# Patient Record
Sex: MALE | Race: WHITE | Employment: FULL TIME | URBAN - METROPOLITAN AREA
[De-identification: names, ages, dates, MRNs, and addresses within clinical notes are randomized per-mention and may not be internally consistent; named-entity substitution may affect disease eponyms.]

---

## 2019-10-08 PROBLEM — Z98.890 S/P RIGHT KNEE ARTHROSCOPY: Status: ACTIVE | Noted: 2019-10-08

## 2024-08-30 ENCOUNTER — APPOINTMENT (OUTPATIENT)
Dept: GENERAL RADIOLOGY | Facility: HOSPITAL | Age: 54
End: 2024-08-30
Attending: EMERGENCY MEDICINE
Payer: OTHER MISCELLANEOUS

## 2024-08-30 ENCOUNTER — HOSPITAL ENCOUNTER (EMERGENCY)
Facility: HOSPITAL | Age: 54
Discharge: HOME | End: 2024-08-30
Attending: EMERGENCY MEDICINE
Payer: OTHER MISCELLANEOUS

## 2024-08-30 VITALS
SYSTOLIC BLOOD PRESSURE: 159 MMHG | TEMPERATURE: 98 F | BODY MASS INDEX: 40 KG/M2 | OXYGEN SATURATION: 97 % | RESPIRATION RATE: 16 BRPM | WEIGHT: 270 LBS | DIASTOLIC BLOOD PRESSURE: 90 MMHG | HEART RATE: 73 BPM

## 2024-08-30 DIAGNOSIS — M25.511 ACUTE PAIN OF RIGHT SHOULDER: Primary | ICD-10-CM

## 2024-08-30 PROCEDURE — 73030 X-RAY EXAM OF SHOULDER: CPT | Performed by: EMERGENCY MEDICINE

## 2024-08-30 PROCEDURE — 96372 THER/PROPH/DIAG INJ SC/IM: CPT

## 2024-08-30 PROCEDURE — 99283 EMERGENCY DEPT VISIT LOW MDM: CPT

## 2024-08-30 PROCEDURE — 99284 EMERGENCY DEPT VISIT MOD MDM: CPT

## 2024-08-30 RX ORDER — KETOROLAC TROMETHAMINE 30 MG/ML
30 INJECTION, SOLUTION INTRAMUSCULAR; INTRAVENOUS ONCE
Status: COMPLETED | OUTPATIENT
Start: 2024-08-30 | End: 2024-08-30

## 2024-08-30 NOTE — ED INITIAL ASSESSMENT (HPI)
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 Fell onto right elbow with arm bent. Complaining of right shoulder pain, difficulty lifting shoulder.

## 2024-08-31 NOTE — ED QUICK NOTES
Late entry d/t pt care:  Assumed care of pt from Lizette RN:  Pt resting on ED cart, breathing non-labored with equal and symmetrical chest rise and fall, in no apparent distress observed, updated pt on POC: awaiting x-ray results for dispo.

## 2024-08-31 NOTE — ED PROVIDER NOTES
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  He denies any elbow or hand pain.  Denies any weakness or numbness or paresthesias. No head injuries     History reviewed.   Past Medical History:    High blood pressure       History reviewed.   Past Surgical History:   Procedure Laterality Date    Knee arthroscopy Right          Medications :  (Not in a hospital admission)       No family history on file.    Smoking Status:   Social History     Socioeconomic History    Marital status:    Tobacco Use    Smoking status: Former    Smokeless tobacco: Never   Substance and Sexual Activity    Alcohol use: Yes       Constitutional and vital signs reviewed.      Social History and Family History elements reviewed from today, pertinent positives to the presenting problem noted.    Physical Exam     ED Triage Vitals [08/30/24 1723]   /90   Pulse 73   Resp 16   Temp 98.2 °F (36.8 °C)   Temp src Temporal   SpO2 97 %   O2 Device None (Room air)       All measures to prevent infection transmission during my interaction with the patient were taken. The patient was already wearing a droplet mask on my arrival to the room. Personal protective equipment was worn throughout the duration of the exam.  Handwashing was performed prior to and after the exam.  Stethoscope and any equipment used during my examination was cleaned with super sani-cloth germicidal wipes following the exam.     Physical Exam    General: NAD  Head: Normocephalic and atraumatic.  Mouth/Throat/Ears/Nose: No hoarseness of voice  Eyes: Conjunctivae and EOM are normal.  Neck: Normal range of motion. Supple. No midline cervical pain   Cardiovascular: Normal rate  Respiratory/Chest: No tachypnea. CTAB  Gastrointestinal:  Nondistended  Musculoskeletal:No swelling or deformity. Ttp at R shoulder. 2+ radial pulse, SILT, no open lesions.  5 out of 5 right hand  strength, 5 out of 5 shoulder abduction strength.  Neurological: Alert and appropriate.   Skin: Skin is warm and dry. No pallor.  Psychiatric: Has a normal mood and affect.        ED Course      Labs Reviewed - No data to display    As Interpreted by me    Imaging Results Available and Reviewed while in ED: No results found.  ED Medications Administered:   Medications   ketorolac (Toradol) 30 MG/ML injection 30 mg (30 mg Intramuscular Given 8/30/24 1754)         MDM     Vitals:    08/30/24 1723   BP: 159/90   Pulse: 73   Resp: 16   Temp: 98.2 °F (36.8 °C)   TempSrc: Temporal   SpO2: 97%   Weight: 122.5 kg     *I personally reviewed and interpreted all ED vitals.    Pulse Ox: 97%, Room air, Normal       Medical Decision Making      Differential Diagnosis/ Diagnostic Considerations: R shoulder fracture, contusion     Complicating Factors: The patient already has does not have any pertinent problems on file. to contribute to the complexity of this ED evaluation.    I reviewed prior chart records including office note from 10/1/2019.  Patient here with right shoulder contusion, x-ray negative for fracture on my interpretation.  Feels improved after supportive care.    Occupational health referral provided.  Dc In stable condition.  Patient is comfortable with the plan.    Prescriptions:  over-the-counter ibuprofen      Disposition and Plan     Clinical Impression:  1. Acute pain of right shoulder        Disposition:  Discharge    Follow-up:  [facility]  [address] [id number] [phone]  Call in 3 days      Ry Bedoya MD  [address]  [address]  [facility] [address]  [phone]    Schedule an appointment as soon as possible for a visit in 1 day(s)        Medications Prescribed:  Discharge Medication List as of 8/30/2024   7:30 PM

## 2024-09-10 ENCOUNTER — OFFICE VISIT (OUTPATIENT)
Dept: ORTHOPEDICS CLINIC | Facility: CLINIC | Age: 54
End: 2024-09-10

## 2024-09-10 VITALS — HEART RATE: 69 BPM | DIASTOLIC BLOOD PRESSURE: 80 MMHG | SYSTOLIC BLOOD PRESSURE: 124 MMHG

## 2024-09-10 DIAGNOSIS — M75.21 BICEPS TENDINITIS OF RIGHT SHOULDER: ICD-10-CM

## 2024-09-10 DIAGNOSIS — M75.81 RIGHT ROTATOR CUFF TENDINITIS: Primary | ICD-10-CM

## 2024-09-10 PROCEDURE — 20610 DRAIN/INJ JOINT/BURSA W/O US: CPT | Performed by: ORTHOPAEDIC SURGERY

## 2024-09-10 PROCEDURE — 99244 OFF/OP CNSLTJ NEW/EST MOD 40: CPT | Performed by: ORTHOPAEDIC SURGERY

## 2024-09-10 RX ORDER — TRIAMCINOLONE ACETONIDE 40 MG/ML
40 INJECTION, SUSPENSION INTRA-ARTICULAR; INTRAMUSCULAR ONCE
Status: COMPLETED | OUTPATIENT
Start: 2024-09-10 | End: 2024-09-10

## 2024-09-10 RX ADMIN — TRIAMCINOLONE ACETONIDE 40 MG: 40 INJECTION, SUSPENSION INTRA-ARTICULAR; INTRAMUSCULAR at 17:55:00

## 2024-09-10 NOTE — H&P
NURSING INTAKE COMMENTS:   Chief Complaint   Patient presents with    Injury     Consult- WC- R shoulder- onset 8/30/2024- missed a step on a forklift at work -visit to ER and has xray in the system- rates pain 5-6/10 depends w/ certain movements       HPI: This 54 year old right hand dominant male presents today.  For Workmen's Compensation injury to his right shoulder from 8/30/2024.  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  He went to step down after putting the forklift back on the truck on the date.  He missed a step.  He pulled on his right shoulder but also fell and landed on the right shoulder.  He has persistent pain with certain activities and sleeping sometimes is difficult.  He has continued to work full duty.  He denies prior problems of the right shoulder.  He denies numbness or tingling or cervical radiculopathy complaints.    He lives independent with his wife, daughter, and mother-in-law.  He drives regular car.  Outside of work he leads a sedentary lifestyle.  He is not diabetic.    Past Medical History:    High blood pressure     Past Surgical History:   Procedure Laterality Date    Knee arthroscopy Right      Current Outpatient Medications   Medication Sig Dispense Refill    HYDROcodone-acetaminophen (NORCO) 5-325 MG Oral Tab 1po q 4 hrs prn pain, may increase to 2 tabs q 6 hrs prn severe pain (Patient not taking: Reported on 9/10/2024) 20 tablet 0    Irbesartan-hydroCHLOROthiazide 150-12.5 MG Oral Tab  (Patient not taking: Reported on 8/30/2024)      Meloxicam 15 MG Oral Tab Take 1 tablet (15 mg total) by mouth daily. (Patient not taking: Reported on 9/10/2024) 30 tablet 1     No Known Allergies  History reviewed. No pertinent family history.  No family Hx of DVT/PE    Social History     Occupational History    Not on file   Tobacco Use    Smoking status: Former    Smokeless tobacco:  Never   Substance and Sexual Activity    Alcohol use: Yes    Drug use: Not on file    Sexual activity: Not on file        Review of Systems:  GENERAL: feels generally well, no significant weight loss or weight gain  SKIN: no ulcerated or worrisome skin lesions  EYES:denies blurred vision or double vision  HEENT: denies new nasal congestion, sinus pain or ST  LUNGS: denies shortness of breath  CARDIOVASCULAR: denies chest pain  GI: no hematemesis, no worsening heartburn, no diarrhea  : no dysuria, no blood in urine, no difficulty urinating, no incontinence  MUSCULOSKELETAL: no other musculoskeletal complaints other than in HPI  NEURO: no numbness or tingling, no weakness or balance disorder  PSYCHE: no depression or anxiety  HEMATOLOGIC: no hx of blood dyscrasia, no Hx DVT/PE  ENDOCRINE: no thyroid or diabetes issues  ALL/ASTHMA: no new hx of severe allergy or asthma    Physical Examination:    /80   Pulse 69   Constitutional: appears well hydrated, alert and responsive, no acute distress noted  Extremities: Contours of the right shoulder were symmetrically normal to the left.  No bruising or ecchymosis either.  Musculoskeletal: Full symmetric motion in all directions right shoulder.  Good rotator cuff strength.  Maybe a little weakness in the supraspinatus compared to the other tendons.  Positive impingement and biceps provocative test.  Tenderness mostly to the rotator cuff insertion site.  No acromioclavicular tenderness and negative crossover.  Neurological: Normal motor and sensory right upper extremity.  No signs of cervical radiculopathy.    Imaging: X-rays of the right shoulder were essentially normal.      XR SHOULDER, COMPLETE (MIN 2 VIEWS), RIGHT (CPT=73030)    Result Date: 8/30/2024  PROCEDURE: XR SHOULDER, COMPLETE (MIN 2 VIEWS), RIGHT (CPT=73030)  COMPARISON: None.  INDICATIONS: Right shoulder pain post accidental fall onto right side today.  TECHNIQUE: 3 views were obtained.   FINDINGS:   BONES: No evidence of acute fracture or dislocation.  Mild acromioclavicular degenerative change. SOFT TISSUES: Negative. No visible soft tissue swelling. EFFUSION: None visible. OTHER: Negative.         CONCLUSION: No acute fracture or dislocation.    Dictated by (CST): Issac Rodriguez MD on 8/30/2024 at 6:54 PM     Finalized by (CST): Issac Rodriguez MD on 8/30/2024 at 6:56 PM             No results found for: \"WBC\", \"HGB\", \"PLT\"   Lab Results   Component Value Date     (H) 09/14/2019    BUN 24.0 (H) 09/14/2019    CREATSERUM 1.09 09/14/2019        Assessment and Plan:  Diagnoses and all orders for this visit:    Right rotator cuff tendinitis  -     PHYSICAL THERAPY - INTERNAL  -     Arthrocentesis aspiration and injection major Right joint bursa w/o US  -     triamcinolone acetonide (Kenalog-40) 40 MG/ML injection 40 mg    Biceps tendinitis of right shoulder  -     PHYSICAL THERAPY - INTERNAL  -     Arthrocentesis aspiration and injection major Right joint bursa w/o US  -     triamcinolone acetonide (Kenalog-40) 40 MG/ML injection 40 mg        Assessment: Mostly rotator cuff tendinitis and some biceps tendinitis.  Work comp injury from 8/30/2024.    Plan: I recommended cortisone injection, physical therapy, and home exercise program learned in therapy.  He agreed to the above.  Aspiration was negative and he tolerated the injection bupivacaine 0.5% 5 cc and Kenalog 1 cc well to the glenohumeral joint with a little to the subacromial space.    He may continue to work full duty without restrictions.  He will do the home exercise program.  I will see him in 4 weeks.  If he is not improved, we would order MRI.    Follow Up: No follow-ups on file.    Ry Bedoya MD

## 2024-09-10 NOTE — PROGRESS NOTES
Per verbal order from Dr. Bedoya, draw up 5ml of 0.5% Marcaine and 1ml of Kenalog 40 for cortisone injection to right shoulder. Janette GRAYSON MA  Patient provided education handout for cortisone injection.

## 2024-10-21 ENCOUNTER — OFFICE VISIT (OUTPATIENT)
Dept: ORTHOPEDICS CLINIC | Facility: CLINIC | Age: 54
End: 2024-10-21
Payer: OTHER MISCELLANEOUS

## 2024-10-21 DIAGNOSIS — G56.02 LEFT CARPAL TUNNEL SYNDROME: ICD-10-CM

## 2024-10-21 DIAGNOSIS — M75.21 BICEPS TENDINITIS OF RIGHT SHOULDER: ICD-10-CM

## 2024-10-21 DIAGNOSIS — M75.81 RIGHT ROTATOR CUFF TENDINITIS: Primary | ICD-10-CM

## 2024-10-21 NOTE — PROGRESS NOTES
NURSING INTAKE COMMENTS:   Chief Complaint   Patient presents with    Follow - Up     Left shoulder- denies pain- PT finished       HPI: This 54 year old male presents today follow-up right shoulder work comp injury as well as a new left carpal tunnel complaint not work comp.    For the right shoulder, I gave cortisone injection and prescribed therapy.  He said the right shoulder is pain-free with full range of motion.  He feels capable of full duty without restriction.    The left carpal tunnel was evident about 2 years ago.  He had EMG about 2 years ago but cannot recall where it was done.  He was scheduled for surgery but it improved and he canceled surgery.    He flared with the left carpal tunnel this past weekend without specific trauma.  He went to the ER.  He gets numbness and tingling in the median nerve distribution and pain at the base of the palm.  He feels he has weak .  No cervical radiculopathy complaints.    He denied chest pain, shortness of breath, heart attack, stroke, or history of blood clot.    Past Medical History:    High blood pressure     Past Surgical History:   Procedure Laterality Date    Knee arthroscopy Right      Current Outpatient Medications   Medication Sig Dispense Refill    HYDROcodone-acetaminophen (NORCO) 5-325 MG Oral Tab 1po q 4 hrs prn pain, may increase to 2 tabs q 6 hrs prn severe pain (Patient not taking: Reported on 10/21/2024) 20 tablet 0    Irbesartan-hydroCHLOROthiazide 150-12.5 MG Oral Tab  (Patient not taking: Reported on 10/21/2024)      Meloxicam 15 MG Oral Tab Take 1 tablet (15 mg total) by mouth daily. (Patient not taking: Reported on 10/21/2024) 30 tablet 1     Allergies[1]  History reviewed. No pertinent family history.  No family Hx of DVT/PE    Social History     Occupational History    Not on file   Tobacco Use    Smoking status: Former    Smokeless tobacco: Never   Substance and Sexual Activity    Alcohol use: Yes    Drug use: Not on file    Sexual  activity: Not on file        Review of Systems:  GENERAL: feels generally well, no significant weight loss or weight gain  SKIN: no ulcerated or worrisome skin lesions  EYES:denies blurred vision or double vision  HEENT: denies new nasal congestion, sinus pain or ST  LUNGS: denies shortness of breath  CARDIOVASCULAR: denies chest pain  GI: no hematemesis, no worsening heartburn, no diarrhea  : no dysuria, no blood in urine, no difficulty urinating, no incontinence  MUSCULOSKELETAL: no other musculoskeletal complaints other than in HPI  NEURO: no numbness or tingling, no weakness or balance disorder  PSYCHE: no depression or anxiety  HEMATOLOGIC: no hx of blood dyscrasia, no Hx DVT/PE  ENDOCRINE: no thyroid or diabetes issues  ALL/ASTHMA: no new hx of severe allergy or asthma    Physical Examination:    There were no vitals taken for this visit.  Constitutional: appears well hydrated, alert and responsive, no acute distress noted  Extremities: Right shoulder with benign exam and full range of motion and normal strength.  Negative provocative tests.  Musculoskeletal: Left hand with no thenar or hypothenar atrophy or vascular insufficiency.  He has positive Tinel's sign and positive median nerve compression test on the left at 7 seconds.  Weak  compared to right.  Pinch is still strong.  Neurological: Decreased sensation median nerve distribution left hand.    Imaging: None taken today.      No results found.     No results found for: \"WBC\", \"HGB\", \"PLT\"   Lab Results   Component Value Date     (H) 09/14/2019    BUN 24.0 (H) 09/14/2019    CREATSERUM 1.09 09/14/2019        Assessment and Plan:  Diagnoses and all orders for this visit:    Right rotator cuff tendinitis    Biceps tendinitis of right shoulder    Left carpal tunnel syndrome  -     US WRIST LEFT COMPLETE (CPT=76881); Future        Assessment: For the right shoulder, he may be considered at maximal medical improvement and continue to work full  duty without restrictions.  He will see me as needed.    Plan: For the left carpal tunnel syndrome which is not a Workmen's Compensation injury, we talked about bracing for another month versus surgical release.  He was scheduled for surgery 2 years ago but canceled.  He wants surgery at this time.  We talked about surgery with local versus local and sedation.  He was going to go with local.    We talked about risks of surgery such as death heart attack and stroke, bleeding infection blood clot, nerve injury, artery injury, and persistent symptoms despite surgery.    As we cannot find the EMG, I recommended ultrasound.  I asked that he leave a phone message once the ultrasound is done and we can talk about the results and talk about scheduling left carpal tunnel release.    Follow Up: No follow-ups on file.    Ry Bedoya MD         [1] No Known Allergies

## 2024-11-11 ENCOUNTER — TELEPHONE (OUTPATIENT)
Dept: ORTHOPEDICS CLINIC | Facility: CLINIC | Age: 54
End: 2024-11-11

## 2024-11-11 NOTE — TELEPHONE ENCOUNTER
EMG results from 6/7/2021 at [facility] [facility] received and in Dr Bedoya's folder for review. Advised patient we will Follow up once Dr Bedoya reviews.   He states there has been an update on his symptoms and states the left ring finger now has pain and right 5th finger has intermittent swelling. Pain also on anterior bilateral shoulders. Advised him I would update Dr Bedoya and Follow up.

## 2024-11-11 NOTE — TELEPHONE ENCOUNTER
Called patient and he states he had an EMG completed in 2021 at [place] for [facility] and it was faxed over last week. Advised him we haven't rc'd and requested he fax to [phone]. He will re-fax today.

## 2024-11-11 NOTE — TELEPHONE ENCOUNTER
Per patient faxed results to [phone], wants to make sure results were received. Please call thank you.

## 2024-11-11 NOTE — TELEPHONE ENCOUNTER
Patient calling to inform results were sent through fax  last week. Patient has symptoms on the other hand of carpo tunnel. Please call at [phone],thanks.

## 2024-11-12 NOTE — TELEPHONE ENCOUNTER
Called patient and informed him per Dr Bedoya's message. He has US scheduled for 12/16 for left wrist. He inquired about the right hand as well and I advised he would need to be evaluated by Dr Bedoya for this concern since he did not seem him for this concern before. Appointment scheduled on 11/19/24 at 420pm with Dr Bedoya.

## 2024-11-15 ENCOUNTER — TELEPHONE (OUTPATIENT)
Dept: ORTHOPEDICS CLINIC | Facility: CLINIC | Age: 54
End: 2024-11-15

## 2024-11-15 NOTE — TELEPHONE ENCOUNTER
Spoke with patient and he states he cannot wait until 12/16 for US left wrist as he is having pain in both hands and wrists that is severe. Patient does have scheduled appointment on 11/19 for evaluation of the right wrist as only the left wrist has been evaluated by Dr Bedoya previously. Patient has tried ibuprofen, tylenol, ice, heat and nothing helps the pain. I did advise he has scheduled appointment on 11/19 and Dr Bedoya can further discuss and eval the appointment of the options. Did advise if pain severe/unbearable go to UC/ER. Patient requesting letter to be off work until eval on 11/19. Advised I would send message to provider but may not get back to him until 11/18. He verbalized understanding.

## 2024-11-15 NOTE — TELEPHONE ENCOUNTER
Patient states both hands are in severe pain and he does not think he can wait for testing and then wait to schedule surgery. Patient is asking to speak with RN about this severe pain. Please call.

## 2024-11-19 ENCOUNTER — OFFICE VISIT (OUTPATIENT)
Dept: ORTHOPEDICS CLINIC | Facility: CLINIC | Age: 54
End: 2024-11-19
Payer: COMMERCIAL

## 2024-11-19 ENCOUNTER — HOSPITAL ENCOUNTER (OUTPATIENT)
Dept: GENERAL RADIOLOGY | Facility: HOSPITAL | Age: 54
Discharge: HOME | End: 2024-11-19
Attending: ORTHOPAEDIC SURGERY
Payer: COMMERCIAL

## 2024-11-19 DIAGNOSIS — M79.641 PAIN OF RIGHT HAND: ICD-10-CM

## 2024-11-19 DIAGNOSIS — G56.02 LEFT CARPAL TUNNEL SYNDROME: ICD-10-CM

## 2024-11-19 DIAGNOSIS — G56.01 RIGHT CARPAL TUNNEL SYNDROME: Primary | ICD-10-CM

## 2024-11-19 PROCEDURE — 99214 OFFICE O/P EST MOD 30 MIN: CPT | Performed by: ORTHOPAEDIC SURGERY

## 2024-11-19 PROCEDURE — 73130 X-RAY EXAM OF HAND: CPT | Performed by: ORTHOPAEDIC SURGERY

## 2024-11-19 NOTE — PROGRESS NOTES
NURSING INTAKE COMMENTS:   Chief Complaint   Patient presents with    Hand Pain     Right - onset a few years back - states he was dx with carpal tunnel - rates pain 4-10/10 on and off - has numbness and tingling        HPI: This 54 year old male presents today with left greater than right carpal tunnel syndrome.  I have been seeing him for the right before but now the left is even worse.  This fairly classic in the median nerve distribution to the first second and third fingers.  He is having a tough time closing his left hand all the way.  Fortunately there is no thenar atrophy on either hand.  Both had numbness and tingling.    He is scheduled for ultrasound December 16, 2024.  He said he cannot wait that long.  He asked for an EMG prescription and said he will find a place that can get him in sooner.    He had his third complaint is left shoulder but it was completely separate and I told him we would have to make another appointment.  He said it was very difficult to do his job because of the carpal tunnel syndrome bilaterally.    Past Medical History:    High blood pressure     Past Surgical History:   Procedure Laterality Date    Knee arthroscopy Right      Current Outpatient Medications   Medication Sig Dispense Refill    HYDROcodone-acetaminophen (NORCO) 5-325 MG Oral Tab 1po q 4 hrs prn pain, may increase to 2 tabs q 6 hrs prn severe pain (Patient not taking: Reported on 10/21/2024) 20 tablet 0    Irbesartan-hydroCHLOROthiazide 150-12.5 MG Oral Tab  (Patient not taking: Reported on 10/21/2024)      Meloxicam 15 MG Oral Tab Take 1 tablet (15 mg total) by mouth daily. (Patient not taking: Reported on 10/21/2024) 30 tablet 1     Allergies[1]  History reviewed. No pertinent family history.  No family Hx of DVT/PE    Social History     Occupational History    Not on file   Tobacco Use    Smoking status: Former    Smokeless tobacco: Never   Substance and Sexual Activity    Alcohol use: Yes    Drug use: Not on  file    Sexual activity: Not on file        Review of Systems:  GENERAL: feels generally well, no significant weight loss or weight gain  SKIN: no ulcerated or worrisome skin lesions  EYES:denies blurred vision or double vision  HEENT: denies new nasal congestion, sinus pain or ST  LUNGS: denies shortness of breath  CARDIOVASCULAR: denies chest pain  GI: no hematemesis, no worsening heartburn, no diarrhea  : no dysuria, no blood in urine, no difficulty urinating, no incontinence  MUSCULOSKELETAL: no other musculoskeletal complaints other than in HPI  NEURO: no numbness or tingling, no weakness or balance disorder  PSYCHE: no depression or anxiety  HEMATOLOGIC: no hx of blood dyscrasia, no Hx DVT/PE  ENDOCRINE: no thyroid or diabetes issues  ALL/ASTHMA: no new hx of severe allergy or asthma    Physical Examination:    There were no vitals taken for this visit.  Constitutional: appears well hydrated, alert and responsive, no acute distress noted  Extremities: The hands appear normal without vascular issues or atrophy.  Musculoskeletal: Left hand makes about 70% of a fist but can extend fully.  Right hand makes 90% of a fist and can extend fully.  Positive Tinel's and positive median nerve compression test at only 2 to 3 seconds bilaterally.  No signs of cervical radiculopathy today.  Slight weakness to pinch and key pinch.   strength weak bilaterally.  Neurological: Above noted and decreased sensation to the median nerve distributions left worse than right.    Imaging: X-ray of the right hand was normal.    No results found.     No results found for: \"WBC\", \"HGB\", \"PLT\"   Lab Results   Component Value Date     (H) 09/14/2019    BUN 24.0 (H) 09/14/2019    CREATSERUM 1.09 09/14/2019        Assessment and Plan:  Diagnoses and all orders for this visit:    Right carpal tunnel syndrome  -     EMG (At [facility]); Future    Pain of right hand  -     XR HAND (MIN 3 VIEWS), RIGHT (CPT=73130);  Future  -     EMG (At [facility]); Future    Left carpal tunnel syndrome  -     EMG (At [facility]); Future        Assessment: Above diagnoses left is worse than right.    Plan: She wants to try to get at least one of the carpal tunnels done before the new year and possibly both.  The neck step will be to get EMGs.  I told him to get me copies of the report.  We can talk over the phone about the results and possibly schedule one of the surgeries as an outpatient under local anesthetic.    Follow Up: No follow-ups on file.    Ry Bedoya MD         [1] No Known Allergies

## 2024-11-26 ENCOUNTER — TELEPHONE (OUTPATIENT)
Dept: ORTHOPEDICS CLINIC | Facility: CLINIC | Age: 54
End: 2024-11-26

## 2024-11-26 NOTE — TELEPHONE ENCOUNTER
Patients spouse called to ask for the forms department fax and e-mail. Provided. Tried to gather details, patient spouse will reach out with details later. Sent Release of Information authorization via [employer].

## 2024-11-27 NOTE — TELEPHONE ENCOUNTER
Additional forms received via fax, have been emailed to forms department and sent to corporate center.

## 2024-12-03 ENCOUNTER — PROCEDURE VISIT (OUTPATIENT)
Dept: PHYSICAL MEDICINE AND REHAB | Facility: CLINIC | Age: 54
End: 2024-12-03
Payer: COMMERCIAL

## 2024-12-03 DIAGNOSIS — G56.03 BILATERAL CARPAL TUNNEL SYNDROME: Primary | ICD-10-CM

## 2024-12-03 PROCEDURE — 95910 NRV CNDJ TEST 7-8 STUDIES: CPT | Performed by: PHYSICAL MEDICINE & REHABILITATION

## 2024-12-03 PROCEDURE — 95885 MUSC TST DONE W/NERV TST LIM: CPT | Performed by: PHYSICAL MEDICINE & REHABILITATION

## 2024-12-03 NOTE — PROCEDURES
[address]  [address]  [place] [address]  Phone: [phone]  Fax: [phone]    ELECTRODIAGNOSTIC REPORT  Patient: Taras Toussaint Hand Dominance:  Right   Patient ID: [id number] Referring Dr:  Ry Bedoya MD   Sex: Male Test Dr:  Roland Ocasio DO   YOB: 1970           Visit Date: 54 Years Examining MD:     Age: 54 Years Referred by:     Height: 5 feet 9 inch     Referred for: 54 year old [place] male presents with bilateral hand numbness and tingling primarily affecting digits 1-3, along with difficulty flexing the left ring finger 2/2 pain. He underwent an EMG of the bilateral upper extremities at [facility] for similar symptoms that resolved upon wearing wrist braces, only for the symptoms to more recently return.     Physical exam:  Normal muscle bulk of both hands  5/5 bilateral APB muscle strength  Gives way with resisted left ring finger flexion 2/2 pain over the area of the A1 pulley. No appreciable locking.      Summary    The motor conduction test was performed on 4 nerve(s). The results were normal in 2 nerve(s): R Ulnar - ADM, L Ulnar - ADM. Results outside the specified normal range were found in 2 nerve(s), as follows:  In the R Median - APB study  the latency was increased for Wrist stimulation  the amplitude was reduced for Wrist stimulation  the response was considered absent for Elbow stimulation  In the L Median - APB study  the latency was increased for Wrist stimulation    The sensory conduction test was performed on 4 nerve(s). The results were normal in 1 nerve(s): R Ulnar - Digit V (Antidromic). Results outside the specified normal range were found in 3 nerve(s), as follows:  In the R Median - Digit II (Antidromic) study  the peak latency was increased for Wrist stimulation  the peak amplitude was reduced for Wrist stimulation  the peak-to-peak amplitude was reduced for Wrist stimulation  In the L Median - Digit II (Antidromic) study  the  peak latency was increased for Wrist stimulation  the peak amplitude was reduced for Wrist stimulation  the peak-to-peak amplitude was reduced for Wrist stimulation  In the L Ulnar - Digit V (Antidromic) study  the peak latency was increased for Wrist stimulation    The needle EMG study was normal in all 2 tested muscles: R. Abductor pollicis brevis, L. Abductor pollicis brevis.    Similar to his previous study at higher stimulation there was incidental electrodiagnostic evidence of a Jaleel-Evita anastomosis in the right upper extremity.     Conclusion:   1) This is an abnormal electrodiagnostic study.   2) There is electrodiagnostic evidence of bilateral median mononeuropathies across the wrists consistent with the clinical diagnosis of bilateral carpal tunnel syndrome. The findings are mixed demyelinating and axonal in the right upper extremity, moderate to severe; primarily demyelinating, moderate in the left upper extremity.   3) There is electrodiagnostic evidence of a mild demyelinating left ulnar sensory mononeuropathy of undetermined and possibly no clinical significance.     In comparison to a previous EMG done in June of 2021 the right median motor studies have worsened; the left median motor study is similar.     Findings correlate well with the symptoms of numbness and tingling. Consideration could be given to symptomatic trigger finger of the left ring finger as well.     Thank you for allowing me to participate in this patient's care,    Roland Ocasio DO  Physical Medicine and Rehabilitation  [facility]  ________________________________      Motor NCS      Nerve / Sites Muscle Latency Amplitude Amp % Duration Segments Distance Lat Diff Velocity     ms mV % ms  cm ms m/s   R Median - APB      Wrist APB 5.15 0.8 100 7.46 Wrist - APB 8        Ref.  <=4.40 >=4.0 >=100  Ref.         Elbow APB NR NR NR NR Elbow - Wrist  NR       Ref.    >=80  Ref.   >=49   L Median - APB      Wrist  APB 4.96 9.6 100 7.58 Wrist - APB 8        Ref.  <=4.40 >=4.0 >=100  Ref.         Elbow APB 9.19 7.9 82.3 7.92 Elbow - Wrist 22.5 4.23 53      Ref.    >=80  Ref.   >=49   R Ulnar - ADM      Wrist ADM 2.81 9.7 100 5.79 Wrist - ADM 8        Ref.  <=3.90 >=5.0 >=100  Ref.         B.Elbow ADM 6.52 9.1 94 5.81 B.Elbow - Wrist 24 3.71 65      Ref.    >=80  Ref.   >=50      A.Elbow ADM 7.69 8.1 83.4 5.48 A.Elbow - B.Elbow 9 1.17 77      Ref.      Ref.   >=50   L Ulnar - ADM      Wrist ADM 2.83 11.3 100 5.88 Wrist - ADM 8        Ref.  <=3.90 >=5.0 >=100  Ref.         B.Elbow ADM 6.40 10.7 94.3 5.96 B.Elbow - Wrist 23 3.56 65      Ref.    >=80  Ref.   >=50      A.Elbow ADM 7.75 10.8 95.3 5.92 A.Elbow - B.Elbow 8 1.35 59      Ref.      Ref.   >=50       Sensory NCS      Nerve / Sites Rec. Site Onset Lat Peak Lat NP Amp PP Amp Segments Distance Velocity     ms ms µV µV  cm m/s   R Median - Digit II (Antidromic)      Wrist Dig II 3.46 4.35 9.6 11.5 Wrist - Dig II 13 38      Ref.   <=3.40 >=15.0 >=20.0 Ref.     L Median - Digit II (Antidromic)      Wrist Dig II 3.85 4.81 5.5 10.8 Wrist - Dig II 13 34      Ref.   <=3.40 >=15.0 >=20.0 Ref.     R Ulnar - Digit V (Antidromic)      Wrist Dig V 2.17 2.92 15.6 16.9 Wrist - Dig V 14 65      Ref.   <=3.70 >=15.0 >=15.0 Ref.     L Ulnar - Digit V (Antidromic)      Wrist Dig V 3.58 4.94 29.0 23.5 Wrist - Dig V 14 39      Ref.   <=3.70 >=15.0 >=15.0 Ref.         EMG Summary Table     Spontaneous MUAP Recruitment   Muscle Nerve Roots IA Fib PSW Fasc H.F. Comments Amp Dur. PPP Pattern   R. Abductor pollicis brevis Median C8-T1 N None None None None Normal N N N N   L. Abductor pollicis brevis Median C8-T1 N None None None None Normal N N N N

## 2024-12-05 ENCOUNTER — TELEPHONE (OUTPATIENT)
Dept: ORTHOPEDICS CLINIC | Facility: CLINIC | Age: 54
End: 2024-12-05

## 2024-12-05 NOTE — TELEPHONE ENCOUNTER
Per patient had EMG done on 12/3, patient asking if results have been received by Dr. Bedoya and asking what next steps are. Please call thank you.

## 2024-12-06 NOTE — TELEPHONE ENCOUNTER
Family Medical Leave Act forms received with a valid Release of Information and logged for processing. Release of Information scanned in to chart.

## 2024-12-06 NOTE — TELEPHONE ENCOUNTER
Dr. Bedoya,    Patient is requesting Family Medical Leave Act and disability due to carpal tunnel pain. Start: 11/15/24-end: 1/6/25 pending re eval. Do you support?    Thank you,  Deepika

## 2024-12-06 NOTE — TELEPHONE ENCOUNTER
Received disability via fax in the forms department. Sent [employer] message for Release of Information. Logged for processing.

## 2024-12-06 NOTE — TELEPHONE ENCOUNTER
Patient called regarding Release of Information, advised patient that Release of Information for Family Medical Leave Act ([facility] was received. Advised patient to complete Release of Information for the [place]. Patient expressed understanding.     Type of Leave: cont Family Medical Leave Act and disability   Reason for Leave: carpal tunnel   Start date of leave: 11/15/24  End date of leave: pending re eval 1/6/25  How many flare ups per month/length?:  How many appts per month/length?:   Was Fee and Turnaround info Given?:

## 2024-12-09 ENCOUNTER — TELEPHONE (OUTPATIENT)
Dept: ORTHOPEDICS CLINIC | Facility: CLINIC | Age: 54
End: 2024-12-09

## 2024-12-09 NOTE — TELEPHONE ENCOUNTER
Patient called to confirm if Release of Information for the [place] was received. Received via e-mail in the forms department. Scanned into chart.

## 2024-12-09 NOTE — TELEPHONE ENCOUNTER
Per patient is requesting update on carpal tunnel and is wondering what the next steps are regarding the results. Please advice.

## 2024-12-10 ENCOUNTER — OFFICE VISIT (OUTPATIENT)
Dept: ORTHOPEDICS CLINIC | Facility: CLINIC | Age: 54
End: 2024-12-10
Payer: COMMERCIAL

## 2024-12-10 ENCOUNTER — TELEPHONE (OUTPATIENT)
Dept: ORTHOPEDICS CLINIC | Facility: CLINIC | Age: 54
End: 2024-12-10

## 2024-12-10 DIAGNOSIS — M65.342 TRIGGER FINGER, LEFT RING FINGER: ICD-10-CM

## 2024-12-10 DIAGNOSIS — G56.02 LEFT CARPAL TUNNEL SYNDROME: Primary | ICD-10-CM

## 2024-12-10 DIAGNOSIS — G56.01 RIGHT CARPAL TUNNEL SYNDROME: ICD-10-CM

## 2024-12-10 PROCEDURE — 99213 OFFICE O/P EST LOW 20 MIN: CPT | Performed by: ORTHOPAEDIC SURGERY

## 2024-12-10 RX ORDER — CEPHALEXIN 500 MG/1
CAPSULE ORAL
Qty: 4 CAPSULE | Refills: 0 | Status: SHIPPED | OUTPATIENT
Start: 2024-12-10

## 2024-12-10 NOTE — TELEPHONE ENCOUNTER
Ortho Surgery Request  Surgery: Left carpal tunnel release      Surgical Assistant: Miryam Dowell PA-C  Surgery Day Request:   Estimated Procedure Length: 30 minutes  Anesthesia type: Local   Position: Supine with hand table   Special Needs:[]Mini C-Arm  []Large C-arm  []Nurse Assist []SCD's []Surgical Assistant []Ultrasound []Ultrasound Bessy  Equipment: [employer] hand tome  and tools   Location:[facility]  Post Op Visit Date: 10 to 12 days postop  CPT Code: 20307     ICD Code:  Medical Clearance Needed: None  Preadmission Testing Orders:  Use surgeon's preferences card  Additional PAT orders:  Pre OP Orders:  Use [facility] surgeon's personalized order set  Additional Pre Op Orders: Patient to take cephalexin 2000 mg orally on arrival to day surgery.  No IV needed.  PCN Allergy:  No  If Yes:   ____  Admit:  Hospital outpatient surgery  Home Health  No

## 2024-12-10 NOTE — PROGRESS NOTES
NURSING INTAKE COMMENTS:   Chief Complaint   Patient presents with    Hand Pain     Right f/u and bilateral hands EMG results - states he is good but still can't make a fist with the L hand and has pain in the ring finger - the R hand has no pain        HPI: This 54 year old male presents today to talk about bilateral carpal tunnel syndrome confirmed on EMG.  He also new complaint of left fourth triggering but it was not quite triggering, just pain to the A1 pulley.  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  He is ready to schedule left carpal tunnel first and then we would go onto the right carpal tunnel release.    He denies diabetes.    Past Medical History:    High blood pressure     Past Surgical History:   Procedure Laterality Date    Knee arthroscopy Right      Current Outpatient Medications   Medication Sig Dispense Refill    cephALEXin 500 MG Oral Cap Take 4 capsules orally, 2000 mg, once on arrival to day surgery. 4 capsule 0    HYDROcodone-acetaminophen (NORCO) 5-325 MG Oral Tab 1po q 4 hrs prn pain, may increase to 2 tabs q 6 hrs prn severe pain (Patient not taking: Reported on 10/21/2024) 20 tablet 0    Irbesartan-hydroCHLOROthiazide 150-12.5 MG Oral Tab  (Patient not taking: Reported on 10/21/2024)      Meloxicam 15 MG Oral Tab Take 1 tablet (15 mg total) by mouth daily. (Patient not taking: Reported on 10/21/2024) 30 tablet 1     Allergies[1]  History reviewed. No pertinent family history.  No family Hx of DVT/PE    Social History     Occupational History    Not on file   Tobacco Use    Smoking status: Former    Smokeless tobacco: Never   Substance and Sexual Activity    Alcohol use: Yes    Drug use: Not on file    Sexual activity: Not on file        Review of Systems:  GENERAL: feels generally well, no significant weight loss or weight gain  SKIN: no ulcerated or worrisome skin lesions  EYES:denies blurred vision or double vision  HEENT: denies new  nasal congestion, sinus pain or ST  LUNGS: denies shortness of breath  CARDIOVASCULAR: denies chest pain  GI: no hematemesis, no worsening heartburn, no diarrhea  : no dysuria, no blood in urine, no difficulty urinating, no incontinence  MUSCULOSKELETAL: no other musculoskeletal complaints other than in HPI  NEURO: no numbness or tingling, no weakness or balance disorder  PSYCHE: no depression or anxiety  HEMATOLOGIC: no hx of blood dyscrasia, no Hx DVT/PE  ENDOCRINE: no thyroid or diabetes issues  ALL/ASTHMA: no new hx of severe allergy or asthma    Physical Examination:    There were no vitals taken for this visit.  Constitutional: appears well hydrated, alert and responsive, no acute distress noted  Extremities: The skin on the hand and fingers was healthy.  No masses or atrophy of the thenar or hypothenar eminences.  Musculoskeletal: Full range of motion all digits of the had a little trouble with terminal flexion of the left fourth finger.  Neurological: Positive median nerve compression test on the bilateral carpal tunnels at only 5 seconds.  Negative Tinel's sign.  5 of 5 strength.  A1 pulley with mild tenderness but no snapping or locking.  Pinch and  intact.  Imaging: EMGs bilateral hands confirm carpal tunnel syndrome.      XR HAND (MIN 3 VIEWS), RIGHT (CPT=73130)    Result Date: 11/20/2024  PROCEDURE: XR HAND (MIN 3 VIEWS), RIGHT (CPT=73130)  COMPARISON: None.  INDICATIONS: Chronic right hand and wrist pain; no known trauma.  TECHNIQUE: Three views Impression:  Normal alignment  No acute or healing fracture  Normal soft tissues  No significant joint narrowing.  No erosions or spurs Finalized by (CST): Micah Dangelo MD on 11/20/2024 at 10:22 AM             No results found for: \"WBC\", \"HGB\", \"PLT\"   Lab Results   Component Value Date     (H) 09/14/2019    BUN 24.0 (H) 09/14/2019    CREATSERUM 1.09 09/14/2019        Assessment and Plan:  Diagnoses and all orders for this visit:    Left carpal  tunnel syndrome  -     cephALEXin 500 MG Oral Cap; Take 4 capsules orally, 2000 mg, once on arrival to day surgery.    Right carpal tunnel syndrome    Trigger finger, left ring finger        Assessment: Above diagnoses.    Plan: For the trigger digit, we will observe for now.  He wishes to start with the left carpal tunnel.  We will do the surgery under straight local.  We discussed potential risks of surgery such as death, heart attack, stroke, bleed, infection, blood clot, nerve injury, artery, recurrence, and persistent pain and numbness despite surgery.  He has questions which were answered to his satisfaction.    He will take cephalexin 2000 g orally on arrival to day surgery.  He will not need an IV in the surgery will be done under straight local anesthetic.  He can drive himself to and from the surgery if he wishes.  We did discuss that he must keep the wound clean and no heavy lifting for at least 2 weeks.  He wishes to proceed.  Plan will be left carpal tunnel release.  Not need medical clearance.    Follow Up: No follow-ups on file.    Ry Bedoya MD         [1] No Known Allergies

## 2024-12-13 ENCOUNTER — TELEPHONE (OUTPATIENT)
Dept: ORTHOPEDICS CLINIC | Facility: CLINIC | Age: 54
End: 2024-12-13

## 2024-12-13 DIAGNOSIS — G56.01 RIGHT CARPAL TUNNEL SYNDROME: Primary | ICD-10-CM

## 2024-12-13 DIAGNOSIS — G56.02 LEFT CARPAL TUNNEL SYNDROME: Primary | ICD-10-CM

## 2024-12-13 NOTE — TELEPHONE ENCOUNTER
Spoke with patient to offer sooner surgery date of 2/14. He accepted this date. Patient stated that he also discussed Right carpal tunnel release to be also. He would like to get that scheduled. Dr. Bedoya please advise.

## 2024-12-13 NOTE — TELEPHONE ENCOUNTER
Ortho Surgery Request  Surgery: Right carpal tunnel release      Surgical Assistant: Miryam Dowell PA-C  Surgery Day Request:   Estimated Procedure Length: 30 minutes  Anesthesia type: Local   Position: Supine with hand table   Special Needs:[]Mini C-Arm  []Large C-arm  []Nurse Assist []SCD's []Surgical Assistant []Ultrasound []Ultrasound Bessy  Equipment: [employer] hand tome tools and tome  Location: [facility] OR  Post Op Visit Date: 10 to 12 days postop  CPT Code: 23974     ICD Code:  Medical Clearance Needed: None  Preadmission Testing Orders:  Use surgeons preferences card  Additional PAT orders:  Pre OP Orders: Patient to take cephalexin 2000 mg orally on arrival to day surgery.  ____  Additional Pre Op Orders:  PCN Allergy:  No  If Yes:   ____  Admit:  Hospital outpatient surgery  Home Health  No

## 2024-12-13 NOTE — TELEPHONE ENCOUNTER
Patient calling would like to know if he can set a date for surgery for the right hand per patient was told by Dr Bedoya that will also do surgery on the right  hand after 3 weeks from the surgery from the left hand. Please advise

## 2024-12-13 NOTE — TELEPHONE ENCOUNTER
Surgical form for both left and right carpal tunnel releases both on chart now.  I just did the right carpal tunnel release surgery form now.  I did the left 1 on 12/10/2024

## 2024-12-13 NOTE — TELEPHONE ENCOUNTER
Spoke with patient to offer surgery dates. He chose 2/20 and he would like to be added to the wait list.

## 2024-12-13 NOTE — TELEPHONE ENCOUNTER
Patient called to let  know his hr will be sending new forms in 1-2 hours. Advised patient I will let  know.

## 2024-12-13 NOTE — TELEPHONE ENCOUNTER
Spoke with patient to clarify dates for disability and Family Medical Leave Act. Patient stated start date is 11/15/24 to 2/25/25 pending re-eval due to left carpal tunnel sx. Advised patient per supervisor, new paperwork will need to be provided for right hand carpal tunnel sx. Patient verbalized understanding.

## 2024-12-13 NOTE — TELEPHONE ENCOUNTER
Type of surgery:  Left Carpal Tunnel Release   Date: 2/20/25  Location: [facility]  Medical Clearance: No     *Medical:      *Dental:      *Other:  Prior Authorization Status: Pending   Workers Comp:  [place]:  [place]: Yes  POV: 3/3/25

## 2024-12-16 NOTE — TELEPHONE ENCOUNTER
Received disability forms via email, sent patient [employer] message to obtain Release of Information form. Logged for processing.

## 2024-12-16 NOTE — TELEPHONE ENCOUNTER
Type of surgery:  Right carpal tunnel release  Date: 3/13/25  Location: [facility]  Medical Clearance:  No     *Medical:      *Dental:      *Other:  Prior Authorization Status: Pending   Workers Comp:  [place]rina:  [place]: Yes  POV: [place]

## 2024-12-17 NOTE — TELEPHONE ENCOUNTER
Disability forms completed and fax to the [employer] [place] [phone]. Family Medical Leave Act forms completed and faxed to [employer] [phone]. Release of Information on file 12/6/24. Fax confirmed for the [facility]. Fax confirmation failed 3 times for [employer]. Called patient to inform and uploaded forms to [employer]. Patient stated he will call back if fax number was incorrect on Release of Information for [employer].

## 2024-12-17 NOTE — TELEPHONE ENCOUNTER
Dr. Bedoya/Miryam,    Please sign off on 2 forms if you agree to: Continuous Family Medical Leave Act/Disability from 11/15/24 to 2/25/25 due to Left Carpal tunnel sx 2/14/24.    -Signature page will be the first page scanned  -From your Inbasket, Highlight the patient and click Chart   -Double click the 11/26/24 Forms Completion telephone encounter  -Scroll down to the Media section   -Click the blue Hyperlink: Family Medical Leave Act, Dr. Bedoya/Miryam Dowell, 12/17/24 and Disability3, Dr. Bedoya/Miryam Dowell, 12/17/24    -Click Acknowledge located in the top right ribbon/menu   -Drag the mouse into the blank space of the document and a + sign will appear. Left click to   electronically sign the document.  -Once signed, simply exit out of the screen and you signature will be saved.     Thank you,  Venessa SHOEMAKER

## 2024-12-30 NOTE — DISCHARGE INSTRUCTIONS
Keep bandage on for 3 days.  You may open and close your fist and fingers, but do not grab, push, or pull with your left hand.   On Monday, remove bandage and you may shower.  Wash wound gently with soap and water using your other hand.  Do not to soak the wound but it is okay to get wet.  After your shower, place a dab of antibiotic ointment and clean patch Band-Aid.  We will remove the stitches at your next office appointment.    Call Dr. Bedoya's office as soon as possible to schedule  a 2 week follow up appointment,.       HOME INSTRUCTIONS  AMBS[facility] HOME CARE INSTRUCTIONS: POST-OP ANESTHESIA  The medication that you received for sedation or general anesthesia can last up to 24 hours. Your judgment and reflexes may be altered, even if you feel like your normal self.      We Recommend:   Do not drive any motor vehicle or bicycle   Avoid mowing the lawn, playing sports, or working with power tools/applicances (power saws, electric knives or mixers)   That you have someone stay with you on your first night home   Do not drink alcohol or take sleeping pills or tranquilizers   Do not sign legal documents within 24 hours of your procedure   If you had a nerve block for your surgery, take extra care not to put any pressure on your arm or hand for 24 hours    It is normal:  For you to have a sore throat if you had a breathing tube during surgery (while you were asleep!). The sore throat should get better within 48 hours. You can gargle with warm salt water (1/2 tsp in 4 oz warm water) or use a throat lozenge for comfort  To feel muscle aches or soreness especially in the abdomen, chest or neck. The achy feeling should go away in the next 24 hours  To feel weak, sleepy or \"wiped out\". Your should start feeling better in the next 24 hours.   To experience mild discomforts such as sore lip or tongue, headache, cramps, gas pains or a bloated feeling in your abdomen.   To experience mild back pain or soreness for a day or  two if you had spinal or epidural anesthesia.   If you had laparoscopic surgery, to feel shoulder pain or discomfort on the day of surgery.   For some patients to have nausea after surgery/anesthesia    If you feel nausea or experience vomiting:   Try to move around less.   Eat less than usual or drink only liquids until the next morning   Nausea should resolve in about 24 hours    If you have a problem when you are at home:    Call your surgeons office   Discharge Instructions: After Your Surgery  You’ve just had surgery. During surgery, you were given medicine called anesthesia to keep you relaxed and free of pain. After surgery, you may have some pain or nausea. This is common. Here are some tips for feeling better and getting well after surgery.   Going home  Your healthcare provider will show you how to take care of yourself when you go home. They'll also answer your questions. Have an adult family member or friend drive you home. For the first 24 hours after your surgery:   Don't drive or use heavy equipment.  Don't make important decisions or sign legal papers.  Take medicines as directed.  Don't drink alcohol.  Have someone stay with you, if needed. They can watch for problems and help keep you safe.  Be sure to go to all follow-up visits with your healthcare provider. And rest after your surgery for as long as your provider tells you to.   Coping with pain  If you have pain after surgery, pain medicine will help you feel better. Take it as directed, before pain becomes severe. Also, ask your healthcare provider or pharmacist about other ways to control pain. This might be with heat, ice, or relaxation. And follow any other instructions your surgeon or nurse gives you.      Stay on schedule with your medicine.     Tips for taking pain medicine  To get the best relief possible, remember these points:   Pain medicines can upset your stomach. Taking them with a little food may help.  Most pain relievers taken  by mouth need at least 20 to 30 minutes to start to work.  Don't wait till your pain becomes severe before you take your medicine. Try to time your medicine so that you can take it before starting an activity. This might be before you get dressed, go for a walk, or sit down for dinner.  Constipation is a common side effect of some pain medicines. Call your healthcare provider before taking any medicines such as laxatives or stool softeners to help ease constipation. Also ask if you should skip any foods. Drinking lots of fluids and eating foods such as fruits and vegetables that are high in fiber can also help. Remember, don't take laxatives unless your surgeon has prescribed them.  Drinking alcohol and taking pain medicine can cause dizziness and slow your breathing. It can even be deadly. Don't drink alcohol while taking pain medicine.  Pain medicine can make you react more slowly to things. Don't drive or run machinery while taking pain medicine.  Your healthcare provider may tell you to take acetaminophen to help ease your pain. Ask them how much you're supposed to take each day. Acetaminophen or other pain relievers may interact with your prescription medicines or other over-the-counter (OTC) medicines. Some prescription medicines have acetaminophen and other ingredients in them. Using both prescription and OTC acetaminophen for pain can cause you to accidentally overdose. Read the labels on your OTC medicines with care. This will help you to clearly know the list of ingredients, how much to take, and any warnings. It may also help you not take too much acetaminophen. If you have questions or don't understand the information, ask your pharmacist or healthcare provider to explain it to you before you take the OTC medicine.   Managing nausea  Some people have an upset stomach (nausea) after surgery. This is often because of anesthesia, pain, or pain medicine, less movement of food in the stomach, or the stress of  surgery. These tips will help you handle nausea and eat healthy foods as you get better. If you were on a special food plan before surgery, ask your healthcare provider if you should follow it while you get better. Check with your provider on how your eating should progress. It may depend on the surgery you had. These general tips may help:   Don't push yourself to eat. Your body will tell you when to eat and how much.  Start off with clear liquids and soup. They're easier to digest.  Next try semi-solid foods as you feel ready. These include mashed potatoes, applesauce, and gelatin.  Slowly move to solid foods. Don’t eat fatty, rich, or spicy foods at first.  Don't force yourself to have 3 large meals a day. Instead eat smaller amounts more often.  Take pain medicines with a small amount of solid food, such as crackers or toast. This helps prevent nausea.  When to call your healthcare provider  Call your healthcare provider right away if you have any of these:   You still have too much pain, or the pain gets worse, after taking the medicine. The medicine may not be strong enough. Or there may be a complication from the surgery.  You feel too sleepy, dizzy, or groggy. The medicine may be too strong.  Side effects such as nausea or vomiting. Your healthcare provider may advise taking other medicines to .  Skin changes such as rash, itching, or hives. This may mean you have an allergic reaction. Your provider may advise taking other medicines.  The incision looks different (for instance, part of it opens up).  Bleeding or fluid leaking from the incision site, and weren't told to expect that.  Fever of 100.4°F (38°C) or higher, or as directed by your provider.  Call 911  Call 911 right away if you have:   Trouble breathing  Facial swelling    If you have obstructive sleep apnea   You were given anesthesia medicine during surgery to keep you comfortable and free of pain. After surgery, you may have more apnea spells  because of this medicine and other medicines you were given. The spells may last longer than normal.    At home:  Keep using the continuous positive airway pressure (CPAP) device when you sleep. Unless your healthcare provider tells you not to, use it when you sleep, day or night. CPAP is a common device used to treat obstructive sleep apnea.  Talk with your provider before taking any pain medicine, muscle relaxants, or sedatives. Your provider will tell you about the possible dangers of taking these medicines.  Contact your provider if your sleeping changes a lot even when taking medicines as directed.  Shaq last reviewed this educational content on 10/1/2021  © 4688-0273 [employer]. All rights reserved. This information is not intended as a substitute for professional medical care. Always follow your healthcare professional's instructions.

## 2024-12-31 NOTE — H&P
Ry Bedoya MD   Physician  Specialty: SURGERY, ORTHOPEDIC     Progress Notes     Signed          Signed       Expand All Collapse All    NURSING INTAKE COMMENTS:        Chief Complaint   Patient presents with    Hand Pain       Right f/u and bilateral hands EMG results - states he is good but still can't make a fist with the L hand and has pain in the ring finger - the R hand has no pain          HPI: This 54 year old male presents today to talk about bilateral carpal tunnel syndrome confirmed on EMG.  He also new complaint of left fourth triggering but it was not quite triggering, just pain to the A1 pulley.  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  He is ready to schedule left carpal tunnel first and then we would go onto the right carpal tunnel release.     He denies diabetes.     Past Medical History       Past Medical History:    High blood pressure         Past Surgical History         Past Surgical History:   Procedure Laterality Date    Knee arthroscopy Right           Current Medications          Current Outpatient Medications   Medication Sig Dispense Refill    cephALEXin 500 MG Oral Cap Take 4 capsules orally, 2000 mg, once on arrival to day surgery. 4 capsule 0    HYDROcodone-acetaminophen (NORCO) 5-325 MG Oral Tab 1po q 4 hrs prn pain, may increase to 2 tabs q 6 hrs prn severe pain (Patient not taking: Reported on 10/21/2024) 20 tablet 0    Irbesartan-hydroCHLOROthiazide 150-12.5 MG Oral Tab  (Patient not taking: Reported on 10/21/2024)        Meloxicam 15 MG Oral Tab Take 1 tablet (15 mg total) by mouth daily. (Patient not taking: Reported on 10/21/2024) 30 tablet 1         [Allergies]    [Allergies]  No Known Allergies  Family History   History reviewed. No pertinent family history.     No family Hx of DVT/PE     Social History           Occupational History    Not on file   Tobacco Use    Smoking status: Former    Smokeless tobacco: Never    Substance and Sexual Activity    Alcohol use: Yes    Drug use: Not on file    Sexual activity: Not on file         Review of Systems:  GENERAL: feels generally well, no significant weight loss or weight gain  SKIN: no ulcerated or worrisome skin lesions  EYES:denies blurred vision or double vision  HEENT: denies new nasal congestion, sinus pain or ST  LUNGS: denies shortness of breath  CARDIOVASCULAR: denies chest pain  GI: no hematemesis, no worsening heartburn, no diarrhea  : no dysuria, no blood in urine, no difficulty urinating, no incontinence  MUSCULOSKELETAL: no other musculoskeletal complaints other than in HPI  NEURO: no numbness or tingling, no weakness or balance disorder  PSYCHE: no depression or anxiety  HEMATOLOGIC: no hx of blood dyscrasia, no Hx DVT/PE  ENDOCRINE: no thyroid or diabetes issues  ALL/ASTHMA: no new hx of severe allergy or asthma     Physical Examination:    There were no vitals taken for this visit.  Constitutional: appears well hydrated, alert and responsive, no acute distress noted  Extremities: The skin on the hand and fingers was healthy.  No masses or atrophy of the thenar or hypothenar eminences.  Musculoskeletal: Full range of motion all digits of the had a little trouble with terminal flexion of the left fourth finger.  Neurological: Positive median nerve compression test on the bilateral carpal tunnels at only 5 seconds.  Negative Tinel's sign.  5 of 5 strength.  A1 pulley with mild tenderness but no snapping or locking.  Pinch and  intact.  Imaging: EMGs bilateral hands confirm carpal tunnel syndrome.        XR HAND (MIN 3 VIEWS), RIGHT (CPT=73130)     Result Date: 11/20/2024  PROCEDURE:         XR HAND (MIN 3 VIEWS), RIGHT (CPT=73130)  COMPARISON:       None.  INDICATIONS:        Chronic right hand and wrist pain; no known trauma.  TECHNIQUE: Three views Impression:  Normal alignment  No acute or healing fracture  Normal soft tissues  No significant joint narrowing.   No erosions or spurs Finalized by (CST): Micah Dangelo MD on 11/20/2024 at 10:22 AM              No results found for: \"WBC\", \"HGB\", \"PLT\"         Lab Results   Component Value Date      (H) 09/14/2019     BUN 24.0 (H) 09/14/2019     CREATSERUM 1.09 09/14/2019         Assessment and Plan:  Diagnoses and all orders for this visit:     Left carpal tunnel syndrome  -     cephALEXin 500 MG Oral Cap; Take 4 capsules orally, 2000 mg, once on arrival to day surgery.     Right carpal tunnel syndrome     Trigger finger, left ring finger           Assessment: Above diagnoses.     Plan: For the trigger digit, we will observe for now.  He wishes to start with the left carpal tunnel.  We will do the surgery under straight local.  We discussed potential risks of surgery such as death, heart attack, stroke, bleed, infection, blood clot, nerve injury, artery, recurrence, and persistent pain and numbness despite surgery.  He has questions which were answered to his satisfaction.     He will take cephalexin 2000 g orally on arrival to day surgery.  He will not need an IV in the surgery will be done under straight local anesthetic.  He can drive himself to and from the surgery if he wishes.  We did discuss that he must keep the wound clean and no heavy lifting for at least 2 weeks.  He wishes to proceed.  Plan will be left carpal tunnel release.  Not need medical clearance.     Follow Up: No follow-ups on file.     Ry Bedoya MD

## 2025-01-03 ENCOUNTER — HOSPITAL ENCOUNTER (OUTPATIENT)
Facility: HOSPITAL | Age: 55
Setting detail: HOSPITAL OUTPATIENT SURGERY
Discharge: HOME | End: 2025-01-03
Attending: ORTHOPAEDIC SURGERY | Admitting: ORTHOPAEDIC SURGERY
Payer: COMMERCIAL

## 2025-01-03 VITALS
SYSTOLIC BLOOD PRESSURE: 148 MMHG | TEMPERATURE: 98 F | DIASTOLIC BLOOD PRESSURE: 74 MMHG | OXYGEN SATURATION: 97 % | RESPIRATION RATE: 20 BRPM | HEART RATE: 69 BPM

## 2025-01-03 DIAGNOSIS — G56.02 LEFT CARPAL TUNNEL SYNDROME: Primary | ICD-10-CM

## 2025-01-03 PROCEDURE — 01N50ZZ RELEASE MEDIAN NERVE, OPEN APPROACH: ICD-10-PCS | Performed by: ORTHOPAEDIC SURGERY

## 2025-01-03 RX ORDER — ONDANSETRON 2 MG/ML
4 INJECTION INTRAMUSCULAR; INTRAVENOUS EVERY 6 HOURS PRN
Status: CANCELLED | OUTPATIENT
Start: 2025-01-03

## 2025-01-03 RX ORDER — TRAMADOL HYDROCHLORIDE 50 MG/1
50 TABLET ORAL EVERY 8 HOURS PRN
Qty: 15 TABLET | Refills: 0 | Status: SHIPPED | OUTPATIENT
Start: 2025-01-03

## 2025-01-03 RX ORDER — MAGNESIUM HYDROXIDE 1200 MG/15ML
LIQUID ORAL CONTINUOUS PRN
Status: DISCONTINUED | OUTPATIENT
Start: 2025-01-03 | End: 2025-01-03

## 2025-01-03 RX ORDER — IBUPROFEN 200 MG
600 TABLET ORAL EVERY 8 HOURS PRN
Qty: 40 TABLET | Refills: 0 | Status: SHIPPED | OUTPATIENT
Start: 2025-01-03

## 2025-01-03 RX ORDER — BUPIVACAINE HYDROCHLORIDE 5 MG/ML
INJECTION, SOLUTION EPIDURAL; INTRACAUDAL AS NEEDED
Status: DISCONTINUED | OUTPATIENT
Start: 2025-01-03 | End: 2025-01-03 | Stop reason: HOSPADM

## 2025-01-03 RX ORDER — ACETAMINOPHEN 500 MG
1000 TABLET ORAL EVERY 8 HOURS PRN
Qty: 40 TABLET | Refills: 0 | Status: SHIPPED | OUTPATIENT
Start: 2025-01-03

## 2025-01-03 NOTE — BRIEF OP NOTE
Pre-Operative Diagnosis: Left carpal tunnel syndrome [G56.02]     Post-Operative Diagnosis: Left carpal tunnel syndrome [G56.02]      Procedure Performed:   Left Carpal Tunnel Release    Surgeons and Role:     * Ry Bedoya MD - Primary    Assistant(s):  PA: Miryam Dowell PA    Anesthesia: Local     Surgical Findings: Median nerve extruded through transverse carpal ligament incision indicating excessive pressure.  The nerve itself had no masses, lacerations, or adhesions postop.  Tourniquet: 13 minutes at 250 mmHg.  Drain: None  Specimen: None  Complications: None  Estimated Blood Loss: Less than 1 cc  Stable to day surgery.    Ry Bedoya MD  1/3/2025  1:54 PM

## 2025-01-03 NOTE — INTERVAL H&P NOTE
Pre-op Diagnosis: Left carpal tunnel syndrome [G56.02]    The above referenced H&P was reviewed by Ry Bedoya MD on 1/3/2025, the patient was examined and no significant changes have occurred in the patient's condition since the H&P was performed.  I discussed with the patient and/or legal representative the potential benefits, risks and side effects of this procedure; the likelihood of the patient achieving goals; and potential problems that might occur during recuperation.  I discussed reasonable alternatives to the procedure, including risks, benefits and side effects related to the alternatives and risks related to not receiving this procedure.  We will proceed with procedure as planned.

## 2025-01-04 ENCOUNTER — TELEPHONE (OUTPATIENT)
Dept: ORTHOPEDICS CLINIC | Facility: CLINIC | Age: 55
End: 2025-01-04

## 2025-01-04 NOTE — TELEPHONE ENCOUNTER
Post-op call for      Date: 1/4/2025      Time: 3:16 PM   Patient: Taras Toussaint      number: [id number]   Surgery and surgery date:1/3/2025    Patient unavailable.  Left message on voicemail to call clinic with questions or concerns.  Number was provided./DONE    Procedure Laterality Anesthesia   Left Carpal Tunnel Release        Patient's general feeling since discharge/:Everything is fine  Pain control (0-10):/2-3  Pain Medication:  dose/strength/  Medication Quantity Refills Start End   traMADol 50 MG Oral Tab         Medication Quantity Refills Start End   acetaminophen 500 MG Oral Tab         Medication Quantity Refills Start End   ibuprofen 200 MG Oral Tab 40 tablet 0 1/3/2025 --   Sig:   Take 3 tablets (600 m     Using this presently for pain  Fever:  no  Chills: no  SOB: no  Incision site appearance:  Redness  no  Drainag no  Clean/dry/Intact yes   Calf pain, redness or warmth:  no   Bowel Regimen: yes   If not, what are you taking stool softener/laxative?  Confirmed appointment date for post op:/1-  Other concerns patients may ask: He is moving his fingers but has pain to move the thumb  Bathing and bandages   Patient needs to keep incision clean and dry for the first week./DONE  Enforce rest, ice, compression elevation and use their pain medication accordingly./DONE  If the patient needs more pain medication, please put in a communication.

## 2025-01-14 ENCOUNTER — TELEPHONE (OUTPATIENT)
Dept: ORTHOPEDICS CLINIC | Facility: CLINIC | Age: 55
End: 2025-01-14

## 2025-01-14 ENCOUNTER — HOSPITAL ENCOUNTER (OUTPATIENT)
Dept: ULTRASOUND IMAGING | Facility: HOSPITAL | Age: 55
Discharge: HOME | End: 2025-01-14
Payer: COMMERCIAL

## 2025-01-14 ENCOUNTER — OFFICE VISIT (OUTPATIENT)
Dept: ORTHOPEDICS CLINIC | Facility: CLINIC | Age: 55
End: 2025-01-14

## 2025-01-14 DIAGNOSIS — M79.662 PAIN AND SWELLING OF LEFT LOWER LEG: ICD-10-CM

## 2025-01-14 DIAGNOSIS — M79.89 PAIN AND SWELLING OF LEFT LOWER LEG: ICD-10-CM

## 2025-01-14 DIAGNOSIS — M65.312 TRIGGER FINGER OF LEFT THUMB: ICD-10-CM

## 2025-01-14 DIAGNOSIS — M65.342 TRIGGER FINGER, LEFT RING FINGER: ICD-10-CM

## 2025-01-14 DIAGNOSIS — G56.02 LEFT CARPAL TUNNEL SYNDROME: ICD-10-CM

## 2025-01-14 DIAGNOSIS — G56.01 RIGHT CARPAL TUNNEL SYNDROME: Primary | ICD-10-CM

## 2025-01-14 PROCEDURE — 93971 EXTREMITY STUDY: CPT

## 2025-01-14 PROCEDURE — 99024 POSTOP FOLLOW-UP VISIT: CPT

## 2025-01-14 RX ORDER — CEPHALEXIN 500 MG/1
CAPSULE ORAL
Qty: 4 CAPSULE | Refills: 0 | Status: SHIPPED | OUTPATIENT
Start: 2025-01-14

## 2025-01-14 NOTE — PROGRESS NOTES
NURSING INTAKE COMMENTS:   Chief Complaint   Patient presents with    Post-Op     1st POV- left carpal tunnel syndrome on 01/03/25, Rating pain 10/10 with movement. Pt. States that 1 week before surgery he was not able to move his thumb. Per pt. He thought that he was going to be able to move it after surgery but is still unable to move right thumb.        HPI: This 54 year old male presents today with his wife for his first postop visit of left carpal tunnel release.  Patient denies any fever, chills, night sweats.  Reports that his numbness and tingling has improved.  He was having left thumb pain prior to surgery and is still having that.  He reports that it is hard for his left thumb to move.  He reports that his thumb often catches and pops.  Discussed with him that sounds like it is trigger digit, and the treatment for that includes surgery and cortisone injection.  Unfortunately due to him being postop we cannot entertain cortisone at this time.    Patient also notes that he has been having pain behind his left knee and down through his calf.  He reports that it makes standing and walking difficult.  He and his wife had concerns for possible DVT.    Patient is scheduled for right carpal tunnel release on 2/14/2025.    Past Medical History:    High blood pressure     Past Surgical History:   Procedure Laterality Date    Appendectomy      Arthroscopy of joint unlisted      Colonoscopy      Knee arthroscopy Right      Current Outpatient Medications   Medication Sig Dispense Refill    cephALEXin 500 MG Oral Cap Take 4 capsules orally, 2000 mg, once on arrival to day surgery. 4 capsule 0    acetaminophen 500 MG Oral Tab Take 2 tablets (1,000 mg total) by mouth every 8 (eight) hours as needed for Pain. 40 tablet 0    ibuprofen 200 MG Oral Tab Take 3 tablets (600 mg total) by mouth every 8 (eight) hours as needed. Take with food. Stop if stomach upset. 40 tablet 0    traMADol 50 MG Oral Tab Take 1 tablet (50 mg  total) by mouth every 8 (eight) hours as needed. No alcohol or driving on this med. Stop if lethargic or hallucinating. (Patient not taking: Reported on 1/14/2025) 15 tablet 0     Allergies[1]  History reviewed. No pertinent family history.  No family Hx of DVT/PE    Social History     Occupational History    Not on file   Tobacco Use    Smoking status: Former     Types: Cigarettes    Smokeless tobacco: Never   Vaping Use    Vaping status: Never Used   Substance and Sexual Activity    Alcohol use: Yes    Drug use: Not Currently    Sexual activity: Not on file        Review of Systems:  GENERAL: feels generally well, no significant weight loss or weight gain  SKIN: no ulcerated or worrisome skin lesions  EYES:denies blurred vision or double vision  HEENT: denies new nasal congestion, sinus pain or ST  LUNGS: denies shortness of breath  CARDIOVASCULAR: denies chest pain  GI: no hematemesis, no worsening heartburn, no diarrhea  : no dysuria, no blood in urine, no difficulty urinating, no incontinence  MUSCULOSKELETAL: no other musculoskeletal complaints other than in HPI  NEURO: no numbness or tingling, no weakness or balance disorder  PSYCHE: no depression or anxiety  HEMATOLOGIC: no hx of blood dyscrasia, no Hx DVT/PE  ENDOCRINE: no thyroid or diabetes issues  ALL/ASTHMA: no new hx of severe allergy or asthma    Physical Examination:    There were no vitals taken for this visit.  Constitutional: appears well hydrated, alert and responsive, no acute distress noted  Extremities: Lower extremity skin healthy, no pitting edema.  Calf soft nontender.  Incision on left hand healing well.  Sutures removed today.  Musculoskeletal: Patient able to make 60% of a fist.  Able to move all 5 fingers.  No weakness to median, ulnar, radial nerve today.  Did not appreciate any catching or locking in left trigger digit today.  Neurological: Motor and sensory function intact.    Imaging: None taken today.      No results found.      No results found for: \"WBC\", \"HGB\", \"PLT\"   Lab Results   Component Value Date     (H) 09/14/2019    BUN 24.0 (H) 09/14/2019    CREATSERUM 1.09 09/14/2019        Assessment and Plan:  Diagnoses and all orders for this visit:    Right carpal tunnel syndrome  -     Physical Therapy Referral - [facility] Locations    Left carpal tunnel syndrome    Trigger finger, left ring finger  -     Physical Therapy Referral - [facility]    Trigger finger of left thumb  -     Physical Therapy Referral - [facility]    Pain and swelling of left lower leg  -     US VENOUS DOPPLER LEG LEFT - DIAG IMG (CPT=93971); Future    Other orders  -     cephALEXin 500 MG Oral Cap; Take 4 capsules orally, 2000 mg, once on arrival to day surgery.        Assessment: As above    Plan: For his left calf pain we will order an ultrasound to rule out DVT.  Discussed with the patient and his wife I believe it to be a Baker's cyst, but will get it to ensure no DVT postsurgery.    I also placed an order for PT/to begin working on range of motion as well as strengthening his left hand.    As stated above, he is scheduled for right carpal tunnel release on 2/14/2024.  We are planning to do it straight local acetic.  Discussed potential risk of surgery including death, heart attack, stroke, bleeding, infection, blood clot, nerve injury, artery injury, recurrence, persistent pain and numbness despite surgery.  He and his wife answer questions which were answered to their satisfaction.    He will take cephalexin 2000 g orally on arrival to day surgery.  I sent in this prescription today.  He will make an appointment in 3 weeks in case if he still has questions regarding his postoperative left hand.  If he is doing well and has no issues at that time, he may cancel that appointment we will see him the day of surgery.    Follow Up: No follow-ups on file.    WARREN Jaime       [1] No Known Allergies

## 2025-01-14 NOTE — TELEPHONE ENCOUNTER
Spoke to patient and relayed message as shown below. Patient verbalized understanding and would like to know what you believe could be causing his pain. Thank you.

## 2025-01-15 ENCOUNTER — TELEPHONE (OUTPATIENT)
Dept: ORTHOPEDICS CLINIC | Facility: CLINIC | Age: 55
End: 2025-01-15

## 2025-01-15 ENCOUNTER — TELEPHONE (OUTPATIENT)
Dept: PHYSICAL THERAPY | Facility: HOSPITAL | Age: 55
End: 2025-01-15

## 2025-01-15 ENCOUNTER — ORDER TRANSCRIPTION (OUTPATIENT)
Dept: PHYSICAL THERAPY | Facility: HOSPITAL | Age: 55
End: 2025-01-15

## 2025-01-15 DIAGNOSIS — G56.02 LEFT CARPAL TUNNEL SYNDROME: Primary | ICD-10-CM

## 2025-01-15 DIAGNOSIS — M65.342 TRIGGER RING FINGER OF LEFT HAND: ICD-10-CM

## 2025-01-15 DIAGNOSIS — M65.312 TRIGGER FINGER OF LEFT THUMB: ICD-10-CM

## 2025-01-15 NOTE — TELEPHONE ENCOUNTER
Per patient calling stating paperwork that the physical therapy paperwork that he has received from [facility] has an error. Per patient states his paperwork has his \"right wrist written down instead of his left wrist\". Per patient states he needs this corrected and updated before Friday (1/17/2025) at 9am due to that being his first appointment. Please call back when available. Thank you!

## 2025-01-15 NOTE — TELEPHONE ENCOUNTER
Left message to call back.    [facility]am Dowell PA Erculano, Edith, RN; [facility] Clinical Staff  Caller: Unspecified (Yesterday, 12:08 PM)  Based on the pain likely a ruptured baker's cyst. If he is still having pain he can make an appt to be seen for his leg/knee.

## 2025-01-16 NOTE — TELEPHONE ENCOUNTER
Spoke with patient and let him know that Occupational therapy order had been changed. Surgery is correct as in currently. Patient is good to go. No further questions or concerns at this time.

## 2025-01-16 NOTE — TELEPHONE ENCOUNTER
Miryam Dowell PA to Robina Rodríguez, RN  [facility] Clinical Staff       1/14/25  3:23 PM  Based on the pain likely a ruptured baker's cyst. If he is still having pain he can make an appt to be seen for his leg/knee.    Spoke with patient, verified ID, relayed results. He is still having pain and is already scheduled for 1/20/25

## 2025-01-16 NOTE — TELEPHONE ENCOUNTER
Please see patient message below. Patient's surgery needs to be corrected to LEFT carpal tunnel release, not right. Thank you.    Spoke to surgery scheduler, Kae, regarding need to change to left wrist.

## 2025-01-16 NOTE — TELEPHONE ENCOUNTER
I corrected the occupational therapy note several hours ago from right wrist to left wrist.  Let me know if anything else needs to be done.

## 2025-01-17 ENCOUNTER — OFFICE VISIT (OUTPATIENT)
Dept: PHYSICAL THERAPY | Age: 55
End: 2025-01-17
Payer: COMMERCIAL

## 2025-01-17 PROCEDURE — 97165 OT EVAL LOW COMPLEX 30 MIN: CPT

## 2025-01-17 PROCEDURE — 97110 THERAPEUTIC EXERCISES: CPT

## 2025-01-17 NOTE — PROGRESS NOTES
OCCUPATIONAL THERAPY UPPER EXTREMITY EVALUATION     Diagnosis:   S/p (L) CTR      Referring Provider:   MELONIE WALKER Date of Evaluation:    1/16/2025    Precautions:  None Next MD visit:   none scheduled  Date of Surgery: 1/3/2025   Order Date:  1/16/2025  Authorizing Provider:   MELONIE WALKER     Procedure:  OCCUPATIONAL THERAPY - INTERNAL [[id number]]         Order #:   [id number]  Qty:  1     Priority:  Routine                   Class:   IHP - RFL     Standing Interval:          Standing Occurrences:          Expires on:            Expected by:    Associated DX:  Left carpal tunnel syndrome (G56.02)     Order summary:  IHP - RFL, Routine, 3 visits  Occupational  Therapy Area of Concentration: Orthopedic  Occupational Therapy Ortho: UE  If the patient can be seen sooner with a PT vs an OT, can we cancel this order and replace with a PT order? No             PATIENT SUMMARY   Pt is a 54 year old male who presents to therapy today with complaints of decreased function  Pt has had CTS since 2021.  Pt was wearing braces which helped manage symptoms. Pt is a  and uses fork lifts. Pt started experiencing lmited ROM in (B) hands November 2024 and elected to have surgery.  Pt states that 1 week before surgery he was not able to move his thumb. Per pt, he thought that he was going to be able to move it after surgery but is still unable to move right thumb.   Pt describes pain level current 0/10, at best 0/10, at worst 9-10/10.   Current functional limitations include gripping, holding, carrying/lifting, opening jars, weight bearing, occupational duties  Pt describes prior level of function independent.   Employment: Temporary leave  Hand Dominance: right  Living Situation: w/ spouse  Imaging/Tests: EMG  Pt goals include regain PLOF.  Past medical history was reviewed with Pt. Significant findings include none    ASSESSMENT  Pt presents to occupational therapy evaluation with primary c/o decreased  function. The results of the objective tests and measures show decreased ROM, decreased strength, increased edema and increase subjective c/o pain.  Functional deficits include but are not limited to gripping, holding, carrying/lifting, opening jars, weight bearing, occupational duties.  Signs and symptoms are consistent with diagnosis of (L) CTR. Pt and OT discussed evaluation findings, pathology, POC and HEP.  Pt voiced understanding and performs HEP correctly without reported pain. Skilled Occupational Therapy is medically necessary to address the above impairments and reach functional goals.     OBJECTIVE    OBSERVATION: Scab present    ORTHOTICS: none    SCAR:  Closed      SENSORY: WNL      CIRCUMFERENTIAL EDEMA (cm):  Right Wrist crease: 19.3  Left Wrist crease: 19.4    ROM: (* denotes performed with pain)  Wrist   Flexion: R 70, L 65  Extension: R 55, L 45  Ulnar Deviation: R 30, L 30  Radial Deviation R 20, L 15     AROM/PROM:(Degrees)  LEFT HAND:    Thumb IF MF RF SF   MP 35 65 60 60 60   PIP 10 75 70 70 75   DIP --- 40 40 45 55   LYN --- 180 170 175 190         Strength (lbs) Right Average Left Average   : NT NT   2 pt Pinch: NT NT   3 pt Pinch: NT NT   Lateral Pinch: NT NT         Today’s Treatment and Response:   Treatment provided today in addition to the initial evaluation:   Date 1/17/25         Visit # 1/12         Evaluation Initial  X         Manual                                             Ther ex            AROM with fluidotherapy x 10\" x                                                                                            HEP issued See table below         Therapeutic Activity                                                          Neuromuscular Re-education                                         X= performed this date as previously outlined    Pt education was provided on exam findings, treatment diagnosis, treatment plan, expectations, and prognosis. Pt was also provided  recommendations for Oval 8 splint for (L) trigger thumb. Patient was instructed in and issued a HEP.    HEP Date issued  Date amended Date discharged  Comments (HEP2Go code if used)   Tendon glides 1/17/25      Scar management 1/17/25      AROM wrist  1/17/25                                      Charges: OT Eval: Low Complexity, 1TE      Total Timed Treatment: 45 min     Total Treatment Time: 45 min     Based on clinical rationale and outcome measures, this evaluation involved Low Complexity decision making due to 1-2 personal factors/comorbidities, body structures involved/activity limitations, and evolving symptoms including changing pain levels.  PLAN OF CARE   Goals: (to be met in 12 visits)  Increase (L) AROM of wrist flexion by 5-10 degrees to allow pt to use forklift.   Increase LYN of (L) IF by 10-20 degrees to allow pt to use tools.  Increase LYN of (L) MF by 10-20 degrees to allow pt to hold bottles.   Increase LYN of (L) RF by 10-20 degrees to allow pt to carry shopping bag.   Increase LYN of (L) SF by 10-20 degrees to allow pt to  steering wheel.   Assess  and pinch when able.   Pt will be independent and compliant with comprehensive HEP to maintain progress achieved in OT    Frequency / Duration: Patient will be seen for 2 x/week or a total of 12 visits over a 90 day period.  Treatment will include: Manual Therapy, Neuromuscular Re-education, Self-Care Home Management, Therapeutic Activities, Therapeutic Exercise, Home Exercise Program instruction, and Modalities to include: Ultrasound    Education or treatment limitation: None  Rehab Potential:good    Patient/Family/Caregiver was advised of these findings, precautions, and treatment options and has agreed to actively participate in planning and for this course of care.    Thank you for your referral. Please co-sign or sign and return this letter via fax as soon as possible to [phone]. If you have any questions, please contact me at Dept:  [phone]    Sincerely,  Electronically signed by therapist: DAMASO Sharma  Physician's certification required: Yes  I certify the need for these services furnished under this plan of treatment and while under my care.    X___________________________________________________ Date____________________    Certification From: 1/17/2025  To:4/26/2025

## 2025-01-20 ENCOUNTER — HOSPITAL ENCOUNTER (OUTPATIENT)
Dept: GENERAL RADIOLOGY | Facility: HOSPITAL | Age: 55
Discharge: HOME | End: 2025-01-20
Attending: ORTHOPAEDIC SURGERY
Payer: COMMERCIAL

## 2025-01-20 ENCOUNTER — OFFICE VISIT (OUTPATIENT)
Dept: ORTHOPEDICS CLINIC | Facility: CLINIC | Age: 55
End: 2025-01-20
Payer: COMMERCIAL

## 2025-01-20 VITALS
BODY MASS INDEX: 40.73 KG/M2 | DIASTOLIC BLOOD PRESSURE: 89 MMHG | SYSTOLIC BLOOD PRESSURE: 139 MMHG | WEIGHT: 275 LBS | HEIGHT: 69 IN

## 2025-01-20 DIAGNOSIS — G56.01 RIGHT CARPAL TUNNEL SYNDROME: ICD-10-CM

## 2025-01-20 DIAGNOSIS — R52 PAIN: ICD-10-CM

## 2025-01-20 DIAGNOSIS — M23.92 INTERNAL DERANGEMENT OF LEFT KNEE: Primary | ICD-10-CM

## 2025-01-20 DIAGNOSIS — M65.312 TRIGGER FINGER OF LEFT THUMB: ICD-10-CM

## 2025-01-20 DIAGNOSIS — G56.02 LEFT CARPAL TUNNEL SYNDROME: ICD-10-CM

## 2025-01-20 LAB
BASOPHILS NFR SNV: 0 %
EOSINOPHIL NFR SNV: 0 %
LYMPHOCYTES NFR SNV: 18 %
MONOS+MACROS NFR SNV: 25 %
NEUTROPHILS NFR FLD: 57 %
RBC # FLD AUTO: 725 /CUMM (ref ?–1)
TOTAL CELLS COUNTED FLD: 100
TOTAL CELLS COUNTED SNV: 1534 /CUMM (ref 0–200)
WBC # SNV: 1534 /CUMM

## 2025-01-20 PROCEDURE — 73562 X-RAY EXAM OF KNEE 3: CPT | Performed by: ORTHOPAEDIC SURGERY

## 2025-01-20 RX ORDER — COVID-19 ANTIGEN TEST
220 KIT MISCELLANEOUS
COMMUNITY

## 2025-01-20 RX ORDER — TRIAMCINOLONE ACETONIDE 40 MG/ML
40 INJECTION, SUSPENSION INTRA-ARTICULAR; INTRAMUSCULAR ONCE
Status: COMPLETED | OUTPATIENT
Start: 2025-01-20 | End: 2025-01-20

## 2025-01-20 RX ADMIN — TRIAMCINOLONE ACETONIDE 40 MG: 40 INJECTION, SUSPENSION INTRA-ARTICULAR; INTRAMUSCULAR at 14:15:00

## 2025-01-20 NOTE — PROGRESS NOTES
Per verbal order from Dr. Bedoya, draw up 5ml of 0.5% Marcaine and 1ml of Kenalog 40 for cortisone injection to left knee. Renata SANTAMARIA RN  Patient provided education handout for cortisone injection.

## 2025-01-20 NOTE — PROGRESS NOTES
NURSING INTAKE COMMENTS:   Chief Complaint   Patient presents with    Knee Pain     Left knee pain- patient continues to have pain behind the knee that radiates to thigh and calf. US LLE completed on 1/14/25.        HPI: This 54 year old male presents today for new problem of left knee pain as well as follow-up for left carpal tunnel release and upcoming right carpal tunnel release.  He also has left thumb trigger digit recurrence.    I released the left carpal tunnel 1/3/2025.  He is very happy with that surgery and says the numbness and tingling in the left hand is almost resolved.  He had some recurrence of the left thumb trigger digit.  He is in therapy but only had 1 session so far.    Postoperatively he developed left knee pain and we obtained venous Doppler ultrasound which was negative for DVT.  No mention of Rojas's cyst was made.  He describes popliteal area pain more lateral than medial so could be the popliteus or even the lateral hamstrings.  He denies joint line tenderness or patellofemoral pain or swelling.    He is scheduled the right carpal tunnel release  2/14/2025.    Past Medical History:    High blood pressure     Past Surgical History:   Procedure Laterality Date    Appendectomy      Arthroscopy of joint unlisted      Colonoscopy      Knee arthroscopy Right      Current Outpatient Medications   Medication Sig Dispense Refill    Naproxen Sodium (ALEVE) 220 MG Oral Cap Take 220 mg by mouth.      traMADol 50 MG Oral Tab Take 1 tablet (50 mg total) by mouth every 8 (eight) hours as needed. No alcohol or driving on this med. Stop if lethargic or hallucinating. 15 tablet 0    acetaminophen 500 MG Oral Tab Take 2 tablets (1,000 mg total) by mouth every 8 (eight) hours as needed for Pain. 40 tablet 0    ibuprofen 200 MG Oral Tab Take 3 tablets (600 mg total) by mouth every 8 (eight) hours as needed. Take with food. Stop if stomach upset. 40 tablet 0    cephALEXin 500 MG Oral Cap Take 4 capsules orally,  2000 mg, once on arrival to day surgery. (Patient not taking: Reported on 1/20/2025) 4 capsule 0     Allergies[1]  History reviewed. No pertinent family history.  No family Hx of DVT/PE    Social History     Occupational History    Not on file   Tobacco Use    Smoking status: Former     Types: Cigarettes    Smokeless tobacco: Never   Vaping Use    Vaping status: Never Used   Substance and Sexual Activity    Alcohol use: Yes    Drug use: Not Currently    Sexual activity: Not on file        Review of Systems:  GENERAL: feels generally well, no significant weight loss or weight gain  SKIN: no ulcerated or worrisome skin lesions  EYES:denies blurred vision or double vision  HEENT: denies new nasal congestion, sinus pain or ST  LUNGS: denies shortness of breath  CARDIOVASCULAR: denies chest pain  GI: no hematemesis, no worsening heartburn, no diarrhea  : no dysuria, no blood in urine, no difficulty urinating, no incontinence  MUSCULOSKELETAL: no other musculoskeletal complaints other than in HPI  NEURO: no numbness or tingling, no weakness or balance disorder  PSYCHE: no depression or anxiety  HEMATOLOGIC: no hx of blood dyscrasia, no Hx DVT/PE  ENDOCRINE: no thyroid or diabetes issues  ALL/ASTHMA: no new hx of severe allergy or asthma    Physical Examination:    /89   Ht 5' 9\" (1.753 m)   Wt 275 lb (124.7 kg)   BMI 40.61 kg/m²   Constitutional: appears well hydrated, alert and responsive, no acute distress noted  Extremities: Left knee and left leg healthy and symmetrically normal to the right leg and knee.  No effusion or asymmetric warmth.  No definitively palpable Baker's cyst.  Musculoskeletal: Some discomfort to palpation lateral hamstrings and popliteus area.  Motion is symmetric left and right from 0 to 105 degrees.  No medial or lateral joint line tenderness and patella grind was negative for crepitus and pain today.  No instability.    The left hand incision is healing well cosmetically.  No thenar  or hypothenar atrophy.  Positive tenderness to the A1 pulley left thumb.  He can resist me with 5-5 strength with mild flexion of the IP joint.  He can extend fully.    Right wrist without thenar or hypothenar atrophy and positive median nerve compression test to the carpal tunnel.  Neurological: As above.    Imaging: Plain x-rays of the left knee are normal.  Interestingly the right knee has about 50% medial joint space narrowing.    Ultrasound of the left leg was negative for DVT.  No mention of a Baker's cyst was made.      US VENOUS DOPPLER LEG LEFT - DIAG IMG (CPT=93971)    Result Date: 1/14/2025  PROCEDURE: US VENOUS DOPPLER LEG LEFT-DIAG IMG (CPT=93971)  COMPARISON: None.  INDICATIONS: Pain and swelling of left lower leg  TECHNIQUE: Color duplex Doppler venous ultrasound of the left lower extremity was performed in the usual manner.  FINDINGS: The femoral and popliteal veins appear normal.  Normal flow was demonstrated with color and pulsed Doppler.  Visualized portions of the great and small saphenous, posterior tibial, and peroneal veins appear normal.   THROMBI: None visible. COMPRESSIBILITY: Normal. OTHER: Negative.         CONCLUSION: No evidence of left lower extremity DVT.   Dictated by (CST): Marquise Wells MD on 1/14/2025 at 11:52 AM     Finalized by (CST): Marquise Wells MD on 1/14/2025 at 11:52 AM             No results found for: \"WBC\", \"HGB\", \"PLT\"   Lab Results   Component Value Date     (H) 09/14/2019    BUN 24.0 (H) 09/14/2019    CREATSERUM 1.09 09/14/2019        Assessment and Plan:  Diagnoses and all orders for this visit:    Internal derangement of left knee  -     Arthrocentesis aspiration and injection major Left joint bursa w/o US  -     triamcinolone acetonide (Kenalog-40) 40 MG/ML injection 40 mg  -     Cell Count/Diff & Crystals, Synovial; Future  -     Body Fluid Cult Aerobic and Anaerobic; Future    Pain  -     XR KNEE (3 VIEWS), LEFT (CPT=73562); Future  -     Arthrocentesis  aspiration and injection major Left joint bursa w/o US  -     triamcinolone acetonide (Kenalog-40) 40 MG/ML injection 40 mg    Trigger finger of left thumb    Left carpal tunnel syndrome    Right carpal tunnel syndrome        Assessment: For the left leg I think it is probably one of the popliteus or hamstring but there could also be an intra-articular component.  I recommended cortisone injection.  We talked about other options such as physical therapy or MRI.  He wanted to try it injection.  Aspiration of the left knee yielded 17 cc slightly cloudy yellow joint fluid and he tolerated the injection bupivacaine 0.5% 5 cc and Kenalog 1 cc without issue.  The fluid was sent for cell count, crystal analysis, and culture.    If he is not improved in 2 to 3 weeks for the left knee, he will call the office and we would order MRI left knee and then follow in office results.  If the left knee is improved we will just continue with her treatment of the carpal tunnel syndromes.  The left already had the stitches removed.      For the left thumb trigger digit, I told him I would hope the cortisone to his knee would help.  We talked about cortisone directly to the A1 pulley and flexor sheath and even surgical release as well.    Plan: As above.    Follow Up: No follow-ups on file.    Ry Bedoya MD         [1] No Known Allergies

## 2025-01-21 ENCOUNTER — OFFICE VISIT (OUTPATIENT)
Dept: PHYSICAL THERAPY | Age: 55
End: 2025-01-21
Payer: COMMERCIAL

## 2025-01-21 PROCEDURE — 97140 MANUAL THERAPY 1/> REGIONS: CPT

## 2025-01-21 PROCEDURE — 97110 THERAPEUTIC EXERCISES: CPT

## 2025-01-21 NOTE — PROGRESS NOTES
Diagnosis:   S/p (L) CTR      Referring Provider:   MELONIE WALKER Date of Evaluation:    1/16/2025    Precautions:  None Next MD visit:   none scheduled  Date of Surgery: 1/3/2025   Order Date:  1/16/2025  Authorizing Provider:   MELONIE WALKER     Procedure:  OCCUPATIONAL THERAPY - INTERNAL [[id number]]         Order #:   [id number]  Qty:  1     Priority:  Routine                   Class:   IHP - RFL     Standing Interval:          Standing Occurrences:          Expires on:            Expected by:    Associated DX:  Left carpal tunnel syndrome (G56.02)     Order summary:  IHP - RFL, Routine, 3 visits  Occupational  Therapy Area of Concentration: Orthopedic  Occupational Therapy Ortho: UE  If the patient can be seen sooner with a PT vs an OT, can we cancel this order and replace with a PT order? No             SUBJECTIVE: Pt states that he ordered the Oval 8 splint for his trigger thumb  PAIN LEVELS: 1/10      OBJECTIVE: See tx log for details.     ORTHOTICS: none    SCAR:  Closed      SENSORY: WNL      CIRCUMFERENTIAL EDEMA (cm):  Right Wrist crease: 19.3  Left Wrist crease: 19.4    ROM: (* denotes performed with pain)  Wrist   Flexion: R 70, L 65  Extension: R 55, L 45  Ulnar Deviation: R 30, L 30  Radial Deviation R 20, L 15     AROM/PROM:(Degrees)  LEFT HAND:    Thumb IF MF RF SF   MP 35 65 60 60 60   PIP 10 75 70 70 75   DIP --- 40 40 45 55   LYN --- 180 170 175 190         Strength (lbs) Right Average Left Average   : NT NT   2 pt Pinch: NT NT   3 pt Pinch: NT NT   Lateral Pinch: NT NT         ASSESSMENT  Reviewed HEP with pt. Pt performing as instructed.  Pt purchased Oval 8 splint for trigger thumb. Advised pt to avoid thumb flexion as it can increase triggering. Scar closed and healed at this time. Good tolerance to session without any increase in symptoms.      Today’s Treatment and Response:   Treatment provided today in addition to the initial evaluation:   Date 1/17/25 1/21/25       Visit #  1/12 2/12       Evaluation Initial  X         Manual           STM   x        scar massage    x                   Ther ex            AROM with fluidotherapy x 10\" x  x        powerweb    wrist stretches x 3 min        sponges    abd/add                                                                  HEP issued See table below         Therapeutic Activity                                                          Neuromuscular Re-education                                         X= performed this date as previously outlined    Pt education was provided on exam findings, treatment diagnosis, treatment plan, expectations, and prognosis. Pt was also provided recommendations for Oval 8 splint for (L) trigger thumb. Patient was instructed in and issued a HEP.    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   Increase LYN of (L) IF by 10-20 degrees to allow pt to use tools.  Increase LYN of (L) MF by 10-20 degrees to allow pt to hold bottles.   Increase LYN of (L) RF by 10-20 degrees to allow pt to carry shopping bag.   Increase LYN of (L) SF by 10-20 degrees to allow pt to  steering wheel.   Assess  and pinch when able.   Pt will be independent and compliant with comprehensive HEP to maintain progress achieved in OT    PLAN: Patient will be seen for 2 x/week or a total of 12 visits over a 90 day period.  Treatment will include: Manual Therapy, Neuromuscular Re-education, Self-Care Home Management, Therapeutic Activities, Therapeutic Exercise, Home Exercise Program instruction, and Modalities to include: Ultrasound      Charges: 1MT, 2TE       Total Timed Treatment: 45 min     Total Treatment Time: 45 min         Eduin BentonOTR/L

## 2025-01-23 ENCOUNTER — OFFICE VISIT (OUTPATIENT)
Dept: PHYSICAL THERAPY | Age: 55
End: 2025-01-23
Payer: COMMERCIAL

## 2025-01-23 PROCEDURE — 97140 MANUAL THERAPY 1/> REGIONS: CPT

## 2025-01-23 PROCEDURE — 97110 THERAPEUTIC EXERCISES: CPT

## 2025-01-23 NOTE — PROGRESS NOTES
Diagnosis:   S/p (L) CTR      Referring Provider:   MELONIE WALKER Date of Evaluation:    1/16/2025    Precautions:  None Next MD visit:   none scheduled  Date of Surgery: 1/3/2025   Order Date:  1/16/2025  Authorizing Provider:   MELONIE WALKER     Procedure:  OCCUPATIONAL THERAPY - INTERNAL [[id number]]         Order #:   [id number]  Qty:  1     Priority:  Routine                   Class:   IHP - RFL     Standing Interval:          Standing Occurrences:          Expires on:            Expected by:    Associated DX:  Left carpal tunnel syndrome (G56.02)     Order summary:  IHP - RFL, Routine, 3 visits  Occupational  Therapy Area of Concentration: Orthopedic  Occupational Therapy Ortho: UE  If the patient can be seen sooner with a PT vs an OT, can we cancel this order and replace with a PT order? No             SUBJECTIVE: Pt states that his hand is feeling better.       PAIN LEVELS: 1/10      OBJECTIVE: See tx log for details.     ORTHOTICS: none    SCAR:  Closed      SENSORY: WNL      CIRCUMFERENTIAL EDEMA (cm):  Right Wrist crease: 19.3  Left Wrist crease: 19.4    ROM: (* denotes performed with pain)  Wrist   Flexion: R 70, L 65  Extension: R 55, L 45  Ulnar Deviation: R 30, L 30  Radial Deviation R 20, L 15     AROM/PROM:(Degrees)  LEFT HAND:    Thumb IF MF RF SF   MP 35 65 60 60 60   PIP 10 75 70 70 75   DIP --- 40 40 45 55   LYN --- 180 170 175 190         Strength (lbs) Right Average Left Average   : NT NT   2 pt Pinch: NT NT   3 pt Pinch: NT NT   Lateral Pinch: NT NT         ASSESSMENT  Mild weakness reported in wrist. Initiated gentle strengthening exercises on this date. Pt required mild verbal cues to maintain proper body mechanics. Mild fatigue reported in hand after gripping but no increase in pain.  Advised pt to continuee with HEP and scar management. Pt agreed.     Today’s Treatment and Response:   Treatment provided today in addition to the initial evaluation:   Date 1/17/25 1/21/25   1/23/25     Visit # 1/12  2/12  3/12     Evaluation Initial  X         Manual           STM   x  x      scar massage    x  x                 Ther ex            AROM with fluidotherapy x 10\" x  x  x      powerweb    wrist stretches x 3 min  x      sponges    abd/add  with lumbrical bar      wrist exerciser      x 3 min      flexbar strengthening      wrist flex/ext, sup/pro x 2 min each                                        HEP issued See table below         Therapeutic Activity                                                          Neuromuscular Re-education                                         X= performed this date as previously outlined    Pt education was provided on exam findings, treatment diagnosis, treatment plan, expectations, and prognosis. Pt was also provided recommendations for Oval 8 splint for (L) trigger thumb. Patient was instructed in and issued a HEP.    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   Increase LYN of (L) IF by 10-20 degrees to allow pt to use tools.  Increase LYN of (L) MF by 10-20 degrees to allow pt to hold bottles.   Increase LYN of (L) RF by 10-20 degrees to allow pt to carry shopping bag.   Increase LYN of (L) SF by 10-20 degrees to allow pt to  steering wheel.   Assess  and pinch when able.   Pt will be independent and compliant with comprehensive HEP to maintain progress achieved in OT    PLAN: Patient will be seen for 2 x/week or a total of 12 visits over a 90 day period.  Treatment will include: Manual Therapy, Neuromuscular Re-education, Self-Care Home Management, Therapeutic Activities, Therapeutic Exercise, Home Exercise Program instruction, and Modalities to include: Ultrasound      Charges: 1MT,  2TE       Total Timed Treatment: 45 min     Total Treatment Time: 45 min         Eduin Benton,OTR/L

## 2025-01-29 ENCOUNTER — TELEPHONE (OUTPATIENT)
Dept: ORTHOPEDICS CLINIC | Facility: CLINIC | Age: 55
End: 2025-01-29

## 2025-01-29 ENCOUNTER — OFFICE VISIT (OUTPATIENT)
Dept: PHYSICAL THERAPY | Age: 55
End: 2025-01-29
Payer: COMMERCIAL

## 2025-01-29 PROCEDURE — 97110 THERAPEUTIC EXERCISES: CPT

## 2025-01-29 PROCEDURE — 97140 MANUAL THERAPY 1/> REGIONS: CPT

## 2025-01-29 NOTE — PROGRESS NOTES
Diagnosis:   S/p (L) CTR      Referring Provider:   MELONIE WALKER Date of Evaluation:    1/16/2025    Precautions:  None Next MD visit:   none scheduled  Date of Surgery: 1/3/2025   Order Date:  1/16/2025  Authorizing Provider:   MELONIE WALKER     Procedure:  OCCUPATIONAL THERAPY - INTERNAL [[id number]]         Order #:   [id number]  Qty:  1     Priority:  Routine                   Class:   IHP - RFL     Standing Interval:          Standing Occurrences:          Expires on:            Expected by:    Associated DX:  Left carpal tunnel syndrome (G56.02)     Order summary:  IHP - RFL, Routine, 3 visits  Occupational  Therapy Area of Concentration: Orthopedic  Occupational Therapy Ortho: UE  If the patient can be seen sooner with a PT vs an OT, can we cancel this order and replace with a PT order? No             SUBJECTIVE: Pt states that he got ye Oval 8 splint for his thumb.         PAIN LEVELS: 0/10      OBJECTIVE: See tx log for details.     ORTHOTICS: none    SCAR:  Closed      SENSORY: WNL      CIRCUMFERENTIAL EDEMA (cm):  Right Wrist crease: 19.3  Left Wrist crease: 19.4    ROM: (* denotes performed with pain)  Wrist   Flexion: R 70, L 65  Extension: R 55, L 45  Ulnar Deviation: R 30, L 30  Radial Deviation R 20, L 15     AROM/PROM:(Degrees)  LEFT HAND:    Thumb IF MF RF SF   MP 35 65 60 60 60   PIP 10 75 70 70 75   DIP --- 40 40 45 55   LYN --- 180 170 175 190         Strength (lbs) Right Average Left Average   : NT NT   2 pt Pinch: NT NT   3 pt Pinch: NT NT   Lateral Pinch: NT NT         ASSESSMENT  Decrease in scar adhesion noted with palpation. Pt advised to continue to perform scar management. Pt wearing Oval 8 splint for thumb. No triggering or pain reported in thumb as he is wearing splint. Pt advised to perform PROM of thumb to minimize stiffness. Pt agreed.      Today’s Treatment and Response:   Treatment provided today in addition to the initial evaluation:   Date 1/17/25 1/21/25 1/23/25   1/29/25   Visit # 1/12  2/12  3/12  4/12   Evaluation Initial  X         Manual           STM   x  x  x    scar massage    x  x  x               Ther ex            AROM with fluidotherapy x 10\" x  x  x  x    powerweb    wrist stretches x 3 min  x  x    sponges    abd/add  with lumbrical bar  x2    wrist exerciser      x 3 min  x    flexbar strengthening      wrist flex/ext, sup/pro x 2 min each      bilateral ball movements        with weighted ball X 2 min each, (wrist/forearm)                          HEP issued See table below         Therapeutic Activity                                                          Neuromuscular Re-education                                         X= performed this date as previously outlined    Pt education was provided on exam findings, treatment diagnosis, treatment plan, expectations, and prognosis. Pt was also provided recommendations for Oval 8 splint for (L) trigger thumb. Patient was instructed in and issued a HEP.    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   Increase LYN of (L) IF by 10-20 degrees to allow pt to use tools.  Increase LYN of (L) MF by 10-20 degrees to allow pt to hold bottles.   Increase LYN of (L) RF by 10-20 degrees to allow pt to carry shopping bag.   Increase LYN of (L) SF by 10-20 degrees to allow pt to  steering wheel.   Assess  and pinch when able.   Pt will be independent and compliant with comprehensive HEP to maintain progress achieved in OT    PLAN: Patient will be seen for 2 x/week or a total of 12 visits over a 90 day period.  Treatment will include: Manual Therapy, Neuromuscular Re-education, Self-Care Home Management, Therapeutic Activities, Therapeutic Exercise, Home  Exercise Program instruction, and Modalities to include: Ultrasound      Charges: 1MT, 2TE       Total Timed Treatment: 45 min     Total Treatment Time: 45 min         Eduin BentonOTR/L

## 2025-01-29 NOTE — TELEPHONE ENCOUNTER
Patient dropped off disability forms at the [facility] office.    Release of Information completed.    Forms emailed to forms dept, originals sent via interoffice mail.

## 2025-01-31 ENCOUNTER — TELEPHONE (OUTPATIENT)
Dept: ORTHOPEDICS CLINIC | Facility: CLINIC | Age: 55
End: 2025-01-31

## 2025-01-31 NOTE — TELEPHONE ENCOUNTER
Patient called to see if forms department received his forms. Informed patient that there is a 10-12 business day turnaround patient verbally understood.

## 2025-01-31 NOTE — TELEPHONE ENCOUNTER
[employer] - fax received [facility] 1-31-25. Forward to FORMS Dept. 1-31-25.

## 2025-01-31 NOTE — TELEPHONE ENCOUNTER
Received forms via email from office. Valid Release of Information attached. Logged for processing.

## 2025-02-04 ENCOUNTER — OFFICE VISIT (OUTPATIENT)
Dept: ORTHOPEDICS CLINIC | Facility: CLINIC | Age: 55
End: 2025-02-04

## 2025-02-04 ENCOUNTER — OFFICE VISIT (OUTPATIENT)
Dept: PHYSICAL THERAPY | Age: 55
End: 2025-02-04
Payer: COMMERCIAL

## 2025-02-04 DIAGNOSIS — G56.02 LEFT CARPAL TUNNEL SYNDROME: ICD-10-CM

## 2025-02-04 DIAGNOSIS — G56.01 RIGHT CARPAL TUNNEL SYNDROME: Primary | ICD-10-CM

## 2025-02-04 DIAGNOSIS — M25.562 ACUTE PAIN OF LEFT KNEE: ICD-10-CM

## 2025-02-04 PROCEDURE — 99024 POSTOP FOLLOW-UP VISIT: CPT | Performed by: ORTHOPAEDIC SURGERY

## 2025-02-04 PROCEDURE — 99213 OFFICE O/P EST LOW 20 MIN: CPT | Performed by: ORTHOPAEDIC SURGERY

## 2025-02-04 PROCEDURE — 97140 MANUAL THERAPY 1/> REGIONS: CPT

## 2025-02-04 PROCEDURE — 97110 THERAPEUTIC EXERCISES: CPT

## 2025-02-04 NOTE — PROGRESS NOTES
NURSING INTAKE COMMENTS:   Chief Complaint   Patient presents with    Pre-Op     Pt. Presents for a pre-op visit for Right carpal tunnel, Surgery scheduled for 02/14. Denies any pain.        HPI: This 54 year old male presents today here for preoperative exam for the right carpal tunnel release as well as a question of his left knee.  He feels the left knee is little swollen laterally but today when I looked at it, I told him I did not really see a difference from his right knee.  He said it was not really painful but just felt like there was some fluid in there.    With regard to the left carpal tunnel release, he is very pleased.  He has no pain or numbness and tingling.  He has full range of motion of the hand and fingers.  He is finishing therapy.    For the right carpal tunnel, he has no pain or numbness right now but he says he gets numbness and tingling at nighttime.  He wishes to proceed to right carpal tunnel release.  He already has the antibiotic pills ready for surgery on 2/14/2025.    Past Medical History:    High blood pressure     Past Surgical History:   Procedure Laterality Date    Appendectomy      Arthroscopy of joint unlisted      Colonoscopy      Knee arthroscopy Right      Current Outpatient Medications   Medication Sig Dispense Refill    Naproxen Sodium (ALEVE) 220 MG Oral Cap Take 220 mg by mouth. (Patient not taking: Reported on 2/4/2025)      cephALEXin 500 MG Oral Cap Take 4 capsules orally, 2000 mg, once on arrival to day surgery. (Patient not taking: Reported on 2/4/2025) 4 capsule 0    traMADol 50 MG Oral Tab Take 1 tablet (50 mg total) by mouth every 8 (eight) hours as needed. No alcohol or driving on this med. Stop if lethargic or hallucinating. (Patient not taking: Reported on 2/4/2025) 15 tablet 0    acetaminophen 500 MG Oral Tab Take 2 tablets (1,000 mg total) by mouth every 8 (eight) hours as needed for Pain. (Patient not taking: Reported on 2/4/2025) 40 tablet 0    ibuprofen 200  MG Oral Tab Take 3 tablets (600 mg total) by mouth every 8 (eight) hours as needed. Take with food. Stop if stomach upset. (Patient not taking: Reported on 2/4/2025) 40 tablet 0     Allergies[1]  History reviewed. No pertinent family history.  No family Hx of DVT/PE    Social History     Occupational History    Not on file   Tobacco Use    Smoking status: Former     Types: Cigarettes    Smokeless tobacco: Never   Vaping Use    Vaping status: Never Used   Substance and Sexual Activity    Alcohol use: Yes    Drug use: Not Currently    Sexual activity: Not on file        Review of Systems:  GENERAL: feels generally well, no significant weight loss or weight gain  SKIN: no ulcerated or worrisome skin lesions  EYES:denies blurred vision or double vision  HEENT: denies new nasal congestion, sinus pain or ST  LUNGS: denies shortness of breath  CARDIOVASCULAR: denies chest pain  GI: no hematemesis, no worsening heartburn, no diarrhea  : no dysuria, no blood in urine, no difficulty urinating, no incontinence  MUSCULOSKELETAL: no other musculoskeletal complaints other than in HPI  NEURO: no numbness or tingling, no weakness or balance disorder  PSYCHE: no depression or anxiety  HEMATOLOGIC: no hx of blood dyscrasia, no Hx DVT/PE  ENDOCRINE: no thyroid or diabetes issues  ALL/ASTHMA: no new hx of severe allergy or asthma    Physical Examination:    There were no vitals taken for this visit.  Constitutional: appears well hydrated, alert and responsive, no acute distress noted  Extremities: Left hand incision healed cosmetically.  No thenar or hypothenar atrophy.  Full range of motion of the hand and fingers including thumb.  No numbness or tingling.  No signs of infection.  Musculoskeletal: Right hand with positive Tinel sign and median nerve but no pain or numbness at rest right now.  He gets the numbness mostly at night.  He wishes to proceed with surgery.    Left knee with motion 0 to 110 degrees similar to the right knee.   No asymmetry in terms of effusion or asymmetric warmth.  Appears symmetrically normal to the right.  No tenderness to the joint lines or patellofemoral joints.  No instability.  Neurological: Normal motor and sensory bilateral hands and fingers currently but gets numbness and tingling right median nerve distribution at night.    Imaging: None taken today.  X-rays left knee from 1/20/2025 this is a potential moderate left suprapatellar effusion however he did not seem to have a effusion today.      XR KNEE (3 VIEWS), LEFT (CPT=73562)    Result Date: 1/22/2025  PROCEDURE: XR KNEE ROUTINE (3 VIEWS), LEFT (CPT=73562)  COMPARISON: None.  INDICATIONS: left knee pain x 10 days. no trauma  TECHNIQUE: 3 views were obtained.   FINDINGS:  BONES: No acute fracture or dislocation.  No significant degenerative change. SOFT TISSUES: Negative. No visible soft tissue swelling. EFFUSION: Moderate suprapatellar effusion. OTHER: Mild joint space narrowing medial compartment right knee.         CONCLUSION:   No acute fracture or dislocation.  Moderate left suprapatellar effusion.    Dictated by (CST): Issac Rodriguez MD on 1/22/2025 at 1:08 PM     Finalized by (CST): Issac Rodriguez MD on 1/22/2025 at 1:09 PM          US VENOUS DOPPLER LEG LEFT - DIAG IMG (CPT=93971)    Result Date: 1/14/2025  PROCEDURE: US VENOUS DOPPLER LEG LEFT-DIAG IMG (CPT=93971)  COMPARISON: None.  INDICATIONS: Pain and swelling of left lower leg  TECHNIQUE: Color duplex Doppler venous ultrasound of the left lower extremity was performed in the usual manner.  FINDINGS: The femoral and popliteal veins appear normal.  Normal flow was demonstrated with color and pulsed Doppler.  Visualized portions of the great and small saphenous, posterior tibial, and peroneal veins appear normal.   THROMBI: None visible. COMPRESSIBILITY: Normal. OTHER: Negative.         CONCLUSION: No evidence of left lower extremity DVT.   Dictated by (CST): Marquise Wells MD on 1/14/2025 at  11:52 AM     Finalized by (CST): Marquise Wells MD on 1/14/2025 at 11:52 AM             No results found for: \"WBC\", \"HGB\", \"PLT\"   Lab Results   Component Value Date     (H) 09/14/2019    BUN 24.0 (H) 09/14/2019    CREATSERUM 1.09 09/14/2019        Assessment and Plan:  Diagnoses and all orders for this visit:    Right carpal tunnel syndrome    Left carpal tunnel syndrome    Acute pain of left knee        Assessment: Above diagnoses.    Plan: Left carpal tunnel doing well.  Right carpal tunnel scheduled 2/14/2025.  I discussed potential risks of surgery such as death, heart attack, stroke, bleeding, infection, blood clot, nerve injury, artery injury, recurrent carpal tunnel syndrome, revision surgery, noncosmetic scar, and persistent pain despite surgery.  He has questions which were answered to his satisfaction.  He wishes to proceed.    Left knee for now appears benign.  We will observe.    Follow Up: No follow-ups on file.    Ry Bedoya MD         [1] No Known Allergies

## 2025-02-04 NOTE — PROGRESS NOTES
Diagnosis:   S/p (L) CTR      Referring Provider:   MELONIE WALKER Date of Evaluation:    1/16/2025    Precautions:  None Next MD visit:   none scheduled  Date of Surgery: 1/3/2025   Order Date:  1/16/2025  Authorizing Provider:   MELONIE WALKER     Procedure:  OCCUPATIONAL THERAPY - INTERNAL [[id number]]         Order #:   [id number]  Qty:  1     Priority:  Routine                   Class:   IHP - RFL     Standing Interval:          Standing Occurrences:          Expires on:            Expected by:    Associated DX:  Left carpal tunnel syndrome (G56.02)     Order summary:  IHP - RFL, Routine, 3 visits  Occupational  Therapy Area of Concentration: Orthopedic  Occupational Therapy Ortho: UE  If the patient can be seen sooner with a PT vs an OT, can we cancel this order and replace with a PT order? No             SUBJECTIVE: Pt states that he doesn't have any more pain in the thumb.          PAIN LEVELS: 0/10      OBJECTIVE: See tx log for details.     ORTHOTICS: none    SCAR:  Closed      SENSORY: WNL      CIRCUMFERENTIAL EDEMA (cm):  Right Wrist crease: 19.3  Left Wrist crease: 19.4    ROM: (* denotes performed with pain)  Wrist   Flexion: R 70, L 65  Extension: R 55, L 45  Ulnar Deviation: R 30, L 30  Radial Deviation R 20, L 15     AROM/PROM:(Degrees)  LEFT HAND:    Thumb IF MF RF SF   MP 35 65 60 60 60   PIP 10 75 70 70 75   DIP --- 40 40 45 55   LYN --- 180 170 175 190         Strength (lbs) Right Average Left Average   : NT NT   2 pt Pinch: NT NT   3 pt Pinch: NT NT   Lateral Pinch: NT NT         ASSESSMENT  No pain in thumb reported as pt is wearing Oval 8 splint. Pt advised to continue with PROM of thumb to minimize any stiffness. Mild scar tissue noted at CTR. Reviewed scar management and reccommended using golf ball.  Improved strength noted in session. Pt to have (R) CTR on 2/14/25.      Today’s Treatment and Response:   Treatment provided today in addition to the initial evaluation:   Date 1/17/25   1/21/25 1/23/25 1/29/25 2/4/25   Visit # 1/12  2/12  3/12  4/12 5/12   Evaluation Initial  X          Manual            STM   x  x  x x    scar massage    x  x  x x                Ther ex             AROM with fluidotherapy x 10\" x  x  x  x x    powerweb    wrist stretches x 3 min  x  x x    sponges    abd/add  with lumbrical bar  x2  With gripper at 2nd rung x 3    wrist exerciser      x 3 min  x     flexbar strengthening      wrist flex/ext, sup/pro x 2 min each   x    bilateral ball movements        with weighted ball X 2 min each, (wrist/forearm)     digiflex         X 3 min               HEP issued See table below          Therapeutic Activity                                                                 Neuromuscular Re-education                                              X= performed this date as previously outlined    Pt education was provided on exam findings, treatment diagnosis, treatment plan, expectations, and prognosis. Pt was also provided recommendations for Oval 8 splint for (L) trigger thumb. Patient was instructed in and issued a HEP.    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   Increase LYN of (L) IF by 10-20 degrees to allow pt to use tools.  Increase LYN of (L) MF by 10-20 degrees to allow pt to hold bottles.   Increase LYN of (L) RF by 10-20 degrees to allow pt to carry shopping bag.   Increase LYN of (L) SF by 10-20 degrees to allow pt to  steering wheel.   Assess  and pinch when able.   Pt will be independent and compliant with comprehensive HEP to maintain progress achieved in OT    PLAN: Patient will be seen for 2 x/week or a total of 12 visits over a 90 day period.  Treatment will include: Manual Therapy,  Neuromuscular Re-education, Self-Care Home Management, Therapeutic Activities, Therapeutic Exercise, Home Exercise Program instruction, and Modalities to include: Ultrasound      Charges: 1MT, 2TE       Total Timed Treatment: 45 min     Total Treatment Time: 45 min         Eduin Benton OTR/L

## 2025-02-05 NOTE — TELEPHONE ENCOUNTER
Called patient to provide details for disability extension form. Patient stated he will be off 6 weeks following sx date. Requested 3/24/25 as estimated Return to work date pending therapy and post-ops. Patient will call back to verify correct fax number.

## 2025-02-05 NOTE — TELEPHONE ENCOUNTER
The patient called back to confirm that he spoke to his employer and would like the forms to say return 3/25/25 pending re-eval. He also confirmed the correct fax number is [phone]. Asked if we could get the form faxed asap. The rep said she would work on it today but I could not promise when it will be faxed.

## 2025-02-05 NOTE — TELEPHONE ENCOUNTER
Hi Dr. Bedoya,    Please sign off on form if you agree to: Disability extension due to Right carpal tunnel sx 2/14/25, possible Return to work date 3/25/25 pending post-op/therapy    -Signature page will be the first page scanned  -From your Inbasket, Highlight the patient and click Chart   -Double click the 1/29/25 Forms Completion telephone encounter  -Scroll down to the Media section   -Click the blue Hyperlink: Disability, Dr. Bedoya, 2/5/25  -Click Acknowledge located in the top right ribbon/menu   -Drag the mouse into the blank space of the document and a + sign will appear. Left click to   electronically sign the document.  -Once signed, simply exit out of the screen and you signature will be saved.     Thank you,  Venessa SHOEMAKER

## 2025-02-05 NOTE — TELEPHONE ENCOUNTER
The patient called to ask if we could expedite his forms. He needs an extension as he has another surgery coming up and the company is asking for the new forms. Patient filled out a new Release of Information as this form does have a different fax number listed. Release of Information scanned in to chart. Advised the rep.

## 2025-02-06 NOTE — TELEPHONE ENCOUNTER
Forms completed and faxed to [email or web address] [facility] [phone]. [employer] message sent, fax confirmed.

## 2025-02-07 ENCOUNTER — OFFICE VISIT (OUTPATIENT)
Dept: PHYSICAL THERAPY | Age: 55
End: 2025-02-07
Payer: COMMERCIAL

## 2025-02-07 PROCEDURE — 97140 MANUAL THERAPY 1/> REGIONS: CPT

## 2025-02-07 PROCEDURE — 97110 THERAPEUTIC EXERCISES: CPT

## 2025-02-07 NOTE — PROGRESS NOTES
Diagnosis:   S/p (L) CTR      Referring Provider:   MELONIE WALKER Date of Evaluation:    1/16/2025    Precautions:  None Next MD visit:   none scheduled  Date of Surgery: 1/3/2025   Order Date:  1/16/2025  Authorizing Provider:   MELONIE WALKER     Procedure:  OCCUPATIONAL THERAPY - INTERNAL [[id number]]         Order #:   [id number]  Qty:  1     Priority:  Routine                   Class:   IHP - RFL     Standing Interval:          Standing Occurrences:          Expires on:            Expected by:    Associated DX:  Left carpal tunnel syndrome (G56.02)     Order summary:  IHP - RFL, Routine, 3 visits  Occupational  Therapy Area of Concentration: Orthopedic  Occupational Therapy Ortho: UE  If the patient can be seen sooner with a PT vs an OT, can we cancel this order and replace with a PT order? No             SUBJECTIVE: Pt states that his thumb is acting up.          PAIN LEVELS: 0/10      OBJECTIVE: See tx log for details.     ORTHOTICS: none    SCAR:  Closed      SENSORY: WNL      CIRCUMFERENTIAL EDEMA (cm):  Right Wrist crease: 19.3  Left Wrist crease: 19.4    ROM: (* denotes performed with pain)  Wrist   Flexion: R 70, L 65  Extension: R 55, L 45  Ulnar Deviation: R 30, L 30  Radial Deviation R 20, L 15     AROM/PROM:(Degrees)  LEFT HAND:    Thumb IF MF RF SF   MP 35 65 60 60 60   PIP 10 75 70 70 75   DIP --- 40 40 45 55   LYN --- 180 170 175 190         Strength (lbs) Right Average Left Average   : NT NT   2 pt Pinch: NT NT   3 pt Pinch: NT NT   Lateral Pinch: NT NT         ASSESSMENT  Continued triggering reported in (L) thumb. Advised pt to continue to use Oval 8 splint.  Pt to discuss triggering with thumb and possible cortisone injection.  Pt advised to continue with passive stretches of thumb to  prevent stiffness. Initiated gentle wrist PRE's.  Good tolerance to strengthening.    Today’s Treatment and Response:   Treatment provided today in addition to the initial evaluation:   Date 1/17/25   1/21/25 1/23/25 1/29/25 2/4/25 2/7/25   Visit # 1/12  2/12  3/12  4/12 5/12 6/12   Evaluation Initial  X           Manual             STM   x  x  x x x    scar massage    x  x  x x x                 Ther ex              AROM with fluidotherapy x 10\" x  x  x  x x x    powerweb    wrist stretches x 3 min  x  x x x    sponges    abd/add  with lumbrical bar  x2  With gripper at 2nd rung x 3 x    wrist exerciser      x 3 min  x      flexbar strengthening      wrist flex/ext, sup/pro x 2 min each   x    Wrist exerciser      X 3 min    bilateral ball movements        with weighted ball X 2 min each, (wrist/forearm)      digiflex         X 3 min Red   Wrist PRE's          2# (all planes) x 2 min each    HEP issued See table below           Therapeutic Activity                                                                        Neuromuscular Re-education                                                   X= performed this date as previously outlined    Pt education was provided on exam findings, treatment diagnosis, treatment plan, expectations, and prognosis. Pt was also provided recommendations for Oval 8 splint for (L) trigger thumb. Patient was instructed in and issued a HEP.    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   Increase LYN of (L) IF by 10-20 degrees to allow pt to use tools.  Increase LYN of (L) MF by 10-20 degrees to allow pt to hold bottles.   Increase LYN of (L) RF by 10-20 degrees to allow pt to carry shopping bag.   Increase LYN of (L) SF by 10-20 degrees to allow pt to  steering wheel.   Assess  and pinch when able.   Pt will be independent and compliant with comprehensive HEP to maintain progress achieved in OT    PLAN: Patient  will be seen for 2 x/week or a total of 12 visits over a 90 day period.  Treatment will include: Manual Therapy, Neuromuscular Re-education, Self-Care Home Management, Therapeutic Activities, Therapeutic Exercise, Home Exercise Program instruction, and Modalities to include: Ultrasound      Charges: 1MT, 2TE       Total Timed Treatment: 45 min     Total Treatment Time: 45 min         Eduin Benton OTR/L

## 2025-02-11 ENCOUNTER — OFFICE VISIT (OUTPATIENT)
Dept: PHYSICAL THERAPY | Age: 55
End: 2025-02-11
Payer: COMMERCIAL

## 2025-02-11 PROCEDURE — 97110 THERAPEUTIC EXERCISES: CPT

## 2025-02-11 PROCEDURE — 97140 MANUAL THERAPY 1/> REGIONS: CPT

## 2025-02-11 NOTE — PROGRESS NOTES
Diagnosis:   S/p (L) CTR      Referring Provider:   MELONIE WALKER Date of Evaluation:    1/16/2025    Precautions:  None Next MD visit:   none scheduled  Date of Surgery: 1/3/2025   Order Date:  1/16/2025  Authorizing Provider:   MELONIE WALKER     Procedure:  OCCUPATIONAL THERAPY - INTERNAL [[id number]]         Order #:   [id number]  Qty:  1     Priority:  Routine                   Class:   IHP - RFL     Standing Interval:          Standing Occurrences:          Expires on:            Expected by:    Associated DX:  Left carpal tunnel syndrome (G56.02)     Order summary:  IHP - RFL, Routine, 3 visits  Occupational  Therapy Area of Concentration: Orthopedic  Occupational Therapy Ortho: UE  If the patient can be seen sooner with a PT vs an OT, can we cancel this order and replace with a PT order? No             SUBJECTIVE: Pt states that he is having CTR for his right hand on Friday.           PAIN LEVELS: 0/10      OBJECTIVE: See tx log for details.     ORTHOTICS: none    SCAR:  Closed      SENSORY: WNL      CIRCUMFERENTIAL EDEMA (cm):  Right Wrist crease: 19.3  Left Wrist crease: 19.4    ROM: (* denotes performed with pain)  Wrist   Flexion: R 70, L 65  Extension: R 55, L 45  Ulnar Deviation: R 30, L 30  Radial Deviation R 20, L 15     AROM/PROM:(Degrees)  LEFT HAND:    Thumb IF MF RF SF   MP 35 65 60 60 60   PIP 10 75 70 70 75   DIP --- 40 40 45 55   LYN --- 180 170 175 190         Strength (lbs) Right Average Left Average   : NT NT   2 pt Pinch: NT NT   3 pt Pinch: NT NT   Lateral Pinch: NT NT         ASSESSMENT  Decreased triggering reported in thumb as pt is wearing Oval 8 splint. Pt to have surgery for right hand on 2/14/25.  Pt to continue with OT for strengthening for left.     Today’s Treatment and Response:   Treatment provided today in addition to the initial evaluation:   Date 1/17/25 1/21/25 1/23/25 1/29/25 2/4/25 2/7/25 2/11/25   Visit # 1/12  2/12  3/12  4/12 5/12 6/12 7/12   Evaluation  Initial  X            Manual              STM   x  x  x x x x    scar massage    x  x  x x x x    extractor           x   Ther ex               AROM with fluidotherapy x 10\" x  x  x  x x x x    powerweb    wrist stretches x 3 min  x  x x x x    sponges    abd/add  with lumbrical bar  x2  With gripper at 2nd rung x 3 x x    wrist exerciser      x 3 min  x       flexbar strengthening      wrist flex/ext, sup/pro x 2 min each   x  x   Wrist exerciser      X 3 min     bilateral ball movements        with weighted ball X 2 min each, (wrist/forearm)       digiflex         X 3 min Red x   Wrist PRE's          2# (all planes) x 2 min each     HEP issued See table below            Therapeutic Activity                                                                               Neuromuscular Re-education                                                        X= performed this date as previously outlined    Pt education was provided on exam findings, treatment diagnosis, treatment plan, expectations, and prognosis. Pt was also provided recommendations for Oval 8 splint for (L) trigger thumb. Patient was instructed in and issued a HEP.    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   Increase LYN of (L) IF by 10-20 degrees to allow pt to use tools.  Increase LYN of (L) MF by 10-20 degrees to allow pt to hold bottles.   Increase LYN of (L) RF by 10-20 degrees to allow pt to carry shopping bag.   Increase LYN of (L) SF by 10-20 degrees to allow pt to  steering wheel.   Assess  and pinch when able.   Pt will be independent and compliant with comprehensive HEP to maintain progress achieved in OT    PLAN: Patient will be seen for 2 x/week or a total of 12 visits  over a 90 day period.  Treatment will include: Manual Therapy, Neuromuscular Re-education, Self-Care Home Management, Therapeutic Activities, Therapeutic Exercise, Home Exercise Program instruction, and Modalities to include: Ultrasound      Charges: 1MT, 2TE       Total Timed Treatment: 45 min     Total Treatment Time: 45 min         Eduin Benton OTR/L

## 2025-02-12 NOTE — H&P
Ry Bedoya MD   Physician  Specialty: SURGERY, ORTHOPEDIC     Progress Notes     Signed          Signed       Expand All Collapse All    NURSING INTAKE COMMENTS:        Chief Complaint   Patient presents with    Pre-Op       Pt. Presents for a pre-op visit for Right carpal tunnel, Surgery scheduled for 02/14. Denies any pain.          HPI: This 54 year old male presents today here for preoperative exam for the right carpal tunnel release as well as a question of his left knee.  He feels the left knee is little swollen laterally but today when I looked at it, I told him I did not really see a difference from his right knee.  He said it was not really painful but just felt like there was some fluid in there.     With regard to the left carpal tunnel release, he is very pleased.  He has no pain or numbness and tingling.  He has full range of motion of the hand and fingers.  He is finishing therapy.     For the right carpal tunnel, he has no pain or numbness right now but he says he gets numbness and tingling at nighttime.  He wishes to proceed to right carpal tunnel release.  He already has the antibiotic pills ready for surgery on 2/14/2025.     Past Medical History       Past Medical History:    High blood pressure         Past Surgical History         Past Surgical History:   Procedure Laterality Date    Appendectomy        Arthroscopy of joint unlisted        Colonoscopy        Knee arthroscopy Right           Current Medications          Current Outpatient Medications   Medication Sig Dispense Refill    Naproxen Sodium (ALEVE) 220 MG Oral Cap Take 220 mg by mouth. (Patient not taking: Reported on 2/4/2025)        cephALEXin 500 MG Oral Cap Take 4 capsules orally, 2000 mg, once on arrival to day surgery. (Patient not taking: Reported on 2/4/2025) 4 capsule 0    traMADol 50 MG Oral Tab Take 1 tablet (50 mg total) by mouth every 8 (eight) hours as needed. No alcohol or driving on this med. Stop if lethargic  or hallucinating. (Patient not taking: Reported on 2/4/2025) 15 tablet 0    acetaminophen 500 MG Oral Tab Take 2 tablets (1,000 mg total) by mouth every 8 (eight) hours as needed for Pain. (Patient not taking: Reported on 2/4/2025) 40 tablet 0    ibuprofen 200 MG Oral Tab Take 3 tablets (600 mg total) by mouth every 8 (eight) hours as needed. Take with food. Stop if stomach upset. (Patient not taking: Reported on 2/4/2025) 40 tablet 0         [Allergies]    [Allergies]  No Known Allergies  Family History   History reviewed. No pertinent family history.     No family Hx of DVT/PE     Social History            Occupational History    Not on file   Tobacco Use    Smoking status: Former       Types: Cigarettes    Smokeless tobacco: Never   Vaping Use    Vaping status: Never Used   Substance and Sexual Activity    Alcohol use: Yes    Drug use: Not Currently    Sexual activity: Not on file         Review of Systems:  GENERAL: feels generally well, no significant weight loss or weight gain  SKIN: no ulcerated or worrisome skin lesions  EYES:denies blurred vision or double vision  HEENT: denies new nasal congestion, sinus pain or ST  LUNGS: denies shortness of breath  CARDIOVASCULAR: denies chest pain  GI: no hematemesis, no worsening heartburn, no diarrhea  : no dysuria, no blood in urine, no difficulty urinating, no incontinence  MUSCULOSKELETAL: no other musculoskeletal complaints other than in HPI  NEURO: no numbness or tingling, no weakness or balance disorder  PSYCHE: no depression or anxiety  HEMATOLOGIC: no hx of blood dyscrasia, no Hx DVT/PE  ENDOCRINE: no thyroid or diabetes issues  ALL/ASTHMA: no new hx of severe allergy or asthma     Physical Examination:    There were no vitals taken for this visit.  Constitutional: appears well hydrated, alert and responsive, no acute distress noted  Extremities: Left hand incision healed cosmetically.  No thenar or hypothenar atrophy.  Full range of motion of the hand and  fingers including thumb.  No numbness or tingling.  No signs of infection.  Musculoskeletal: Right hand with positive Tinel sign and median nerve but no pain or numbness at rest right now.  He gets the numbness mostly at night.  He wishes to proceed with surgery.     Left knee with motion 0 to 110 degrees similar to the right knee.  No asymmetry in terms of effusion or asymmetric warmth.  Appears symmetrically normal to the right.  No tenderness to the joint lines or patellofemoral joints.  No instability.  Neurological: Normal motor and sensory bilateral hands and fingers currently but gets numbness and tingling right median nerve distribution at night.     Imaging: None taken today.  X-rays left knee from 1/20/2025 this is a potential moderate left suprapatellar effusion however he did not seem to have a effusion today.        XR KNEE (3 VIEWS), LEFT (CPT=73562)     Result Date: 1/22/2025  PROCEDURE:         XR KNEE ROUTINE (3 VIEWS), LEFT (CPT=73562)  COMPARISON:       None.  INDICATIONS:        left knee pain x 10 days. no trauma  TECHNIQUE:        3 views were obtained.   FINDINGS:         BONES:        No acute fracture or dislocation.  No significant degenerative change. SOFT TISSUES:        Negative. No visible soft tissue swelling. EFFUSION:        Moderate suprapatellar effusion. OTHER:        Mild joint space narrowing medial compartment right knee.          CONCLUSION:          No acute fracture or dislocation.  Moderate left suprapatellar effusion.    Dictated by (CST): Issac Rodriguez MD on 1/22/2025 at 1:08 PM     Finalized by (CST): Issac Rodriguez MD on 1/22/2025 at 1:09 PM           US VENOUS DOPPLER LEG LEFT - DIAG IMG (CPT=93971)     Result Date: 1/14/2025  PROCEDURE:         US VENOUS DOPPLER LEG LEFT-DIAG IMG (CPT=93971)  COMPARISON:    None.  INDICATIONS:        Pain and swelling of left lower leg  TECHNIQUE:          Color duplex Doppler venous ultrasound of the left lower extremity was  performed in the usual manner.  FINDINGS:          The femoral and popliteal veins appear normal.  Normal flow was demonstrated with color and pulsed Doppler.  Visualized portions of the great and small saphenous, posterior tibial, and peroneal veins appear normal.   THROMBI:          None visible. COMPRESSIBILITY:        Normal. OTHER:      Negative.          CONCLUSION:        No evidence of left lower extremity DVT.   Dictated by (CST): Marquise Wells MD on 1/14/2025 at 11:52 AM     Finalized by (CST): Marquise Wells MD on 1/14/2025 at 11:52 AM              No results found for: \"WBC\", \"HGB\", \"PLT\"         Lab Results   Component Value Date      (H) 09/14/2019     BUN 24.0 (H) 09/14/2019     CREATSERUM 1.09 09/14/2019         Assessment and Plan:  Diagnoses and all orders for this visit:     Right carpal tunnel syndrome     Left carpal tunnel syndrome     Acute pain of left knee           Assessment: Above diagnoses.     Plan: Left carpal tunnel doing well.  Right carpal tunnel scheduled 2/14/2025.  I discussed potential risks of surgery such as death, heart attack, stroke, bleeding, infection, blood clot, nerve injury, artery injury, recurrent carpal tunnel syndrome, revision surgery, noncosmetic scar, and persistent pain despite surgery.  He has questions which were answered to his satisfaction.  He wishes to proceed.     Left knee for now appears benign.  We will observe.     Follow Up: No follow-ups on file.     Ry Bedoya MD

## 2025-02-13 ENCOUNTER — OFFICE VISIT (OUTPATIENT)
Dept: PHYSICAL THERAPY | Age: 55
End: 2025-02-13
Payer: COMMERCIAL

## 2025-02-13 PROCEDURE — 97110 THERAPEUTIC EXERCISES: CPT

## 2025-02-13 NOTE — PROGRESS NOTES
Diagnosis:   S/p (L) CTR      Referring Provider:   MELONIE WALKER Date of Evaluation:    1/16/2025    Precautions:  None Next MD visit:   none scheduled  Date of Surgery: 1/3/2025   Order Date:  1/16/2025  Authorizing Provider:   MELONIE WALKER     Procedure:  OCCUPATIONAL THERAPY - INTERNAL [[id number]]         Order #:   [id number]  Qty:  1     Priority:  Routine                   Class:   IHP - RFL     Standing Interval:          Standing Occurrences:          Expires on:            Expected by:    Associated DX:  Left carpal tunnel syndrome (G56.02)     Order summary:  IHP - RFL, Routine, 3 visits  Occupational  Therapy Area of Concentration: Orthopedic  Occupational Therapy Ortho: UE  If the patient can be seen sooner with a PT vs an OT, can we cancel this order and replace with a PT order? No             SUBJECTIVE: Pt states that his hand is feeling good.            PAIN LEVELS: 0/10      OBJECTIVE: See tx log for details.     ORTHOTICS: none    SCAR:  Closed      SENSORY: WNL      CIRCUMFERENTIAL EDEMA (cm):  Right Wrist crease: 19.3  Left Wrist crease: 19.4    ROM: (* denotes performed with pain)  Wrist   Flexion: R 70, L 65  Extension: R 55, L 45  Ulnar Deviation: R 30, L 30  Radial Deviation R 20, L 15     AROM/PROM:(Degrees)  LEFT HAND:    Thumb IF MF RF SF   MP 35 65 60 60 60   PIP 10 75 70 70 75   DIP --- 40 40 45 55   LYN --- 180 170 175 190         Strength (lbs) Right Average Left Average   : NT NT   2 pt Pinch: NT NT   3 pt Pinch: NT NT   Lateral Pinch: NT NT         ASSESSMENT  Increased resistance levels in clinic. Good tolerance to increased strengthening.  Decreased density of scar tissue noted with palpation. Pt to have surgery for right hand tomorrow. Advised pt to continue with OT for left to increase strength for pt to return to PLOF.      Today’s Treatment and Response:   Treatment provided today in addition to the initial evaluation:   Date  1/21/25 1/23/25 1/29/25 2/4/25  2/7/25 2/11/25 2/13/25   Visit #  2/12  3/12  4/12 5/12 6/12 7/12 8/12   Evaluation             Manual             STM  x  x  x x x x     scar massage  x  x  x x x x     extractor         x    Ther ex              AROM with fluidotherapy x 10\"  x  x  x x x x x    powerweb  wrist stretches x 3 min  x  x x x x x    sponges  abd/add  with lumbrical bar  x2  With gripper at 2nd rung x 3 x x With 3rd rung x 2     wrist exerciser    x 3 min  x        flexbar strengthening    wrist flex/ext, sup/pro x 2 min each   x  x    Wrist exerciser     X 3 min      bilateral ball movements      with weighted ball X 2 min each, (wrist/forearm)        digiflex       X 3 min Red x x   Wrist PRE's         2# (all planes) x 2 min each  X 3#   Forearm workout       Sup/pro with 2 weights x 3 min     HEP issued             Therapeutic Activity                                                                             Neuromuscular Re-education                                                     X= performed this date as previously outlined    Pt education was provided on exam findings, treatment diagnosis, treatment plan, expectations, and prognosis. Pt was also provided recommendations for Oval 8 splint for (L) trigger thumb. Patient was instructed in and issued a HEP.    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   Increase LYN of (L) IF by 10-20 degrees to allow pt to use tools.  Increase LYN of (L) MF by 10-20 degrees to allow pt to hold bottles.   Increase LYN of (L) RF by 10-20 degrees to allow pt to carry shopping bag.   Increase LYN of (L) SF by 10-20 degrees to allow pt to  steering wheel.   Assess  and pinch when able.   Pt will be independent and compliant with  comprehensive HEP to maintain progress achieved in OT    PLAN: Patient will be seen for 2 x/week or a total of 12 visits over a 90 day period.  Treatment will include: Manual Therapy, Neuromuscular Re-education, Self-Care Home Management, Therapeutic Activities, Therapeutic Exercise, Home Exercise Program instruction, and Modalities to include: Ultrasound      Charges:  3 TE       Total Timed Treatment: 45 min     Total Treatment Time: 45 min         Eduin BentonOTR/L

## 2025-02-13 NOTE — DISCHARGE INSTRUCTIONS
Keep bandage on for 3 days.  You may open and close your fist and fingers, but do not grab, push, or pull with your right hand.   On Monday, remove bandage and you may shower.  Wash wound gently with soap and water using your other hand.  Do not to soak the wound but it is okay to get wet.  After your shower, place a dab of antibiotic ointment and clean patch Band-Aid.  We will remove the stitches at your next office appointment.    Call Dr. Bedoya's office as soon as possible to schedule  a 2 week follow up appointment,.

## 2025-02-14 ENCOUNTER — HOSPITAL ENCOUNTER (OUTPATIENT)
Facility: HOSPITAL | Age: 55
Setting detail: HOSPITAL OUTPATIENT SURGERY
Discharge: HOME | End: 2025-02-14
Attending: ORTHOPAEDIC SURGERY | Admitting: ORTHOPAEDIC SURGERY
Payer: COMMERCIAL

## 2025-02-14 ENCOUNTER — APPOINTMENT (OUTPATIENT)
Dept: PHYSICAL THERAPY | Age: 55
End: 2025-02-14
Payer: COMMERCIAL

## 2025-02-14 VITALS
SYSTOLIC BLOOD PRESSURE: 143 MMHG | DIASTOLIC BLOOD PRESSURE: 74 MMHG | HEIGHT: 69 IN | RESPIRATION RATE: 16 BRPM | BODY MASS INDEX: 40.73 KG/M2 | OXYGEN SATURATION: 96 % | TEMPERATURE: 98 F | HEART RATE: 79 BPM | WEIGHT: 275 LBS

## 2025-02-14 DIAGNOSIS — G56.01 RIGHT CARPAL TUNNEL SYNDROME: Primary | ICD-10-CM

## 2025-02-14 PROCEDURE — 01N50ZZ RELEASE MEDIAN NERVE, OPEN APPROACH: ICD-10-PCS | Performed by: ORTHOPAEDIC SURGERY

## 2025-02-14 RX ORDER — ONDANSETRON 2 MG/ML
4 INJECTION INTRAMUSCULAR; INTRAVENOUS EVERY 6 HOURS PRN
Status: CANCELLED | OUTPATIENT
Start: 2025-02-14

## 2025-02-14 RX ORDER — BUPIVACAINE HYDROCHLORIDE 5 MG/ML
INJECTION, SOLUTION EPIDURAL; INTRACAUDAL AS NEEDED
Status: DISCONTINUED | OUTPATIENT
Start: 2025-02-14 | End: 2025-02-14 | Stop reason: HOSPADM

## 2025-02-14 NOTE — INTERVAL H&P NOTE
Pre-op Diagnosis: Right carpal tunnel syndrome [G56.01]    The above referenced H&P was reviewed by Ry Bedoya MD on 2/14/2025, the patient was examined and no significant changes have occurred in the patient's condition since the H&P was performed.  I discussed with the patient and/or legal representative the potential benefits, risks and side effects of this procedure; the likelihood of the patient achieving goals; and potential problems that might occur during recuperation.  I discussed reasonable alternatives to the procedure, including risks, benefits and side effects related to the alternatives and risks related to not receiving this procedure.  We will proceed with procedure as planned.

## 2025-02-14 NOTE — OPERATIVE REPORT
[facility]  part of [facility]    Operative Note         Taras Toussaint Location: OR   [facility] [id number] [id number]   Admission Date 2/14/2025 Operation Date 2/14/2025   Attending Physician Ry Bedoya MD       Patient Name: Taras Toussaint     Preoperative Diagnosis: Right carpal tunnel syndrome [G56.01]     Postoperative Diagnosis: Right carpal tunnel syndrome [G56.01]     Procedure(s):  Right carpal tunnel release     Primary Surgeon: Ry Bedoya MD     Assistant: Emmett Nielsen CSA    Anesthesia: Local     Specimen: None    Estimated Blood Loss: Less than 1 cc    Complications: none      Indications for procedure: Patient is a 54-year-old man with bilateral carpal tunnel syndrome documented by physical exam, x-ray, and EMG.  He has failed nonoperative treatment and the risks and complication of surgery were discussed and no guarantees were given.  The consent was signed.  He had a successful left carpal tunnel release about 1 month ago.    Surgical Findings: Median nerve without lacerations, adhesions, or masses.  Transverse carpal ligament completely incised distal to proximal.      Operative Summary: Patient was brought to the operating room placed in the supine position.  No IV access was necessary as he took oral antibiotics in the preoperative area.  Pulse oximeter and blood pressure monitoring were placed.  A tourniquet was placed on the upper right arm.  He was provisionally prepped with Betadine.  10 cc of Sensorcaine 0.5% without epinephrine was injected into the presumed incision area.  His right upper extremity was then formally prepped and draped in sterile fashion with ChloraPrep    Incision was made in the longitudinal flexor crease at the base of the palm.  Blunt dissection took place through the palmar fat and fascia.  Self-retaining retractor was placed side-to-side.  He assistant held the Ragnell at the distal end of the wound.  The distal edge of the transverse  carpal ligament was carefully incised and expose the deep palmar fat.  A grooved sheath retractor was placed under this and advanced proximally.  Scalpel was used to incise a portion of the transverse carpal ligament more proximally.  The paddles from the [employer] hand home set were then used to dissect the transverse carpal ligament.  At this point the assistant held the Ragnell at the proximal end of the wound.  Under direct visualization, the tome was used to transect the remainder of the transverse carpal ligament.  The nerve was inspected and found no masses, adhesions, or lacerations.  During the case I did inject an additional 2 cc of Sensorcaine 0.5%.    Wound was closed with 3-0 nylon suture in horizontal interrupted mattress suture.  Xeroform, sterile gauze, sterile Webril, and Ace wrap continue the dressings.  The patient was instructed to hold pressure for 15 minutes on, 5 minutes off for the next 2 hours for hemostasis.  He was brought to day surgery in good condition.  He will follow with Dr. Bedoya in 10 to 12 days time in the office.  Postop instructions were given in both written and oral form.    Implants: None    Drains: None    Condition: Stable to day surgery.      Ry Bedoya MD

## 2025-02-15 ENCOUNTER — TELEPHONE (OUTPATIENT)
Dept: ORTHOPEDICS CLINIC | Facility: CLINIC | Age: 55
End: 2025-02-15

## 2025-02-15 NOTE — TELEPHONE ENCOUNTER
Post-op call for Dr. Bedoya    Date: 2/15/2025      Time: 4:32 PM   Patient: Taras Toussaint      number: [id number]   Surgery and surgery date:2-    Right Carpal Tunnel Release     Patient unavailable.  Left message on voicemail to call clinic with questions or concerns.  Number was provided./ SPOKE TO PATIENT  Patient's general feeling since discharge:/ wonderful  Pain control (0-10): /1  Pain Medication:  dose/strength/  Medication Quantity Refills Start End   acetaminophen 500 MG Oral Tab 40 tablet 0 1/3/2025 --   Sig:   Take 2 tablets (1,000 mg total) by mouth every 8 (eight) hours as needed for Pain.       Medication Quantity Refills Start End   ibuprofen 200 MG Oral Tab 40 tablet 0 1/3/2025 --   Sig:   Take 3 tablets (600 mg total)        Fever: no  Chills: no  SOB no  Incision site appearance:  Redness  no  Drainage no  Clean/dry/Intact yes   Calf pain, redness or warmth:   no   Bowel Regimen: Yes  LBM  today   If not, what are you taking stool softener/laxative?  Confirmed appointment date for post op:  2-  Other concerns patients may ask: He is keeping it elevated and we discussed ice application near the wrist 20min on 20 min off multiple times a day  Bathing and bandages   Patient needs to keep incision clean and dry for the first week./DONE  Enforce rest, ice, compression elevation and use their pain medication accordingly./DONE  If the patient needs more pain medication, please put in a communication.     
no

## 2025-02-19 ENCOUNTER — OFFICE VISIT (OUTPATIENT)
Dept: PHYSICAL THERAPY | Age: 55
End: 2025-02-19
Payer: COMMERCIAL

## 2025-02-19 PROCEDURE — 97110 THERAPEUTIC EXERCISES: CPT

## 2025-02-19 NOTE — PROGRESS NOTES
Diagnosis:   S/p (L) CTR      Referring Provider:   MELONIE WALKER Date of Evaluation:    1/16/2025    Precautions:  None Next MD visit:   none scheduled  Date of Surgery: 1/3/2025   Order Date:  1/16/2025  Authorizing Provider:   MELONIE WALKER     Procedure:  OCCUPATIONAL THERAPY - INTERNAL [[id number]]         Order #:   [id number]  Qty:  1     Priority:  Routine                   Class:   IHP - RFL     Standing Interval:          Standing Occurrences:          Expires on:            Expected by:    Associated DX:  Left carpal tunnel syndrome (G56.02)     Order summary:  IHP - RFL, Routine, 3 visits  Occupational  Therapy Area of Concentration: Orthopedic  Occupational Therapy Ortho: UE  If the patient can be seen sooner with a PT vs an OT, can we cancel this order and replace with a PT order? No             SUBJECTIVE: Pt states that his right hand feels good since the  surgery.      PAIN LEVELS: 0/10      OBJECTIVE: See tx log for details.     ORTHOTICS: none    SCAR:  Closed      SENSORY: WNL      CIRCUMFERENTIAL EDEMA (cm):  Right Wrist crease: 19.3  Left Wrist crease: 19.4    ROM: (* denotes performed with pain)  Wrist   Flexion: R 70, L 65  Extension: R 55, L 45  Ulnar Deviation: R 30, L 30  Radial Deviation R 20, L 15     AROM/PROM:(Degrees)  LEFT HAND:    Thumb IF MF RF SF   MP 35 65 60 60 60   PIP 10 75 70 70 75   DIP --- 40 40 45 55   LYN --- 180 170 175 190         Strength (lbs) Right Average Left Average   : NT NT   2 pt Pinch: NT NT   3 pt Pinch: NT NT   Lateral Pinch: NT NT         ASSESSMENT  Good tolerlance to strengthening.  Pt had surgery last week for (R) CTR.  Sutures appear intact and no signs of infection.  Pt progressing well with left without any increase in symptoms.     Today’s Treatment and Response:   Treatment provided today in addition to the initial evaluation:   Date  1/29/25 2/4/25 2/7/25 2/11/25 2/13/25 2/19/25   Visit #  4/12 5/12 6/12 7/12 8/12 9/12   Evaluation           Manual          STM  x x x x      scar massage  x x x x      extractor     x     Ther ex           AROM with fluidotherapy x 10\"  x x x x x x    powerweb  x x x x x X green    sponges  x2  With gripper at 2nd rung x 3 x x With 3rd rung x 2  x    wrist exerciser  x         flexbar strengthening   x  x     Wrist exerciser   X 3 min       bilateral ball movements  with weighted ball X 2 min each, (wrist/forearm)         digiflex   X 3 min Red x x green   Wrist PRE's     2# (all planes) x 2 min each  X 3# x   Forearm workout     Sup/pro with 2 weights x 3 min  x    HEP issued          Therapeutic Activity                                                                  Neuromuscular Re-education                                              X= performed this date as previously outlined    Pt education was provided on exam findings, treatment diagnosis, treatment plan, expectations, and prognosis. Pt was also provided recommendations for Oval 8 splint for (L) trigger thumb. Patient was instructed in and issued a HEP.    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   Increase LYN of (L) IF by 10-20 degrees to allow pt to use tools.  Increase LYN of (L) MF by 10-20 degrees to allow pt to hold bottles.   Increase LYN of (L) RF by 10-20 degrees to allow pt to carry shopping bag.   Increase LYN of (L) SF by 10-20 degrees to allow pt to  steering wheel.   Assess  and pinch when able.   Pt will be independent and compliant with comprehensive HEP to maintain progress achieved in OT    PLAN: Patient will be seen for 2 x/week or a total of 12 visits over a 90 day period.  Treatment will include: Manual Therapy, Neuromuscular Re-education, Self-Care Home  Management, Therapeutic Activities, Therapeutic Exercise, Home Exercise Program instruction, and Modalities to include: Ultrasound      Charges:  3 TE       Total Timed Treatment: 45 min     Total Treatment Time: 45 min         Eduin Benton OTR/L

## 2025-02-21 ENCOUNTER — OFFICE VISIT (OUTPATIENT)
Dept: PHYSICAL THERAPY | Age: 55
End: 2025-02-21
Payer: COMMERCIAL

## 2025-02-21 PROCEDURE — 97110 THERAPEUTIC EXERCISES: CPT

## 2025-02-21 NOTE — PROGRESS NOTES
Diagnosis:   S/p (L) CTR      Referring Provider:   MELONIE WALKER Date of Evaluation:    1/16/2025    Precautions:  None Next MD visit:   none scheduled  Date of Surgery: 1/3/2025   Order Date:  1/16/2025  Authorizing Provider:   MELONIE WALKER     Procedure:  OCCUPATIONAL THERAPY - INTERNAL [[id number]]         Order #:   [id number]  Qty:  1     Priority:  Routine                   Class:   IHP - RFL     Standing Interval:          Standing Occurrences:          Expires on:            Expected by:    Associated DX:  Left carpal tunnel syndrome (G56.02)     Order summary:  IHP - RFL, Routine, 3 visits  Occupational  Therapy Area of Concentration: Orthopedic  Occupational Therapy Ortho: UE  If the patient can be seen sooner with a PT vs an OT, can we cancel this order and replace with a PT order? No             SUBJECTIVE: Pt states that his thumb is not as painful or triggering.      PAIN LEVELS: 0/10      OBJECTIVE: See tx log for details.     ORTHOTICS: none    SCAR:  Closed      SENSORY: WNL      CIRCUMFERENTIAL EDEMA (cm):  Right Wrist crease: 19.3  Left Wrist crease: 19.4    ROM: (* denotes performed with pain)  Wrist   Flexion: R 70, L 65  Extension: R 55, L 45  Ulnar Deviation: R 30, L 30  Radial Deviation R 20, L 15     AROM/PROM:(Degrees)  LEFT HAND:    Thumb IF MF RF SF   MP 35 65 60 60 60   PIP 10 75 70 70 75   DIP --- 40 40 45 55   LYN --- 180 170 175 190         Strength (lbs) Right Average Left Average   : NT NT   2 pt Pinch: NT NT   3 pt Pinch: NT NT   Lateral Pinch: NT NT         ASSESSMENT  Increased resistance levels in clinic. Good tolerlance to increased resistance.  Decreased pain and triggering reported in left thumb. Advised pt to continue with wearing Oval 8 splint. Pt agreed.      Today’s Treatment and Response:   Treatment provided today in addition to the initial evaluation:   Date 2/4/25 2/7/25 2/11/25 2/13/25 2/19/25 2/21/25   Visit # 5/12 6/12 7/12 8/12 9/12 10/12   Evaluation          Manual         STM x x x       scar massage x x x       extractor   x      Ther ex          AROM with fluidotherapy x 10\" x x x x x x    powerweb x x x x X green x    sponges  With gripper at 2nd rung x 3 x x With 3rd rung x 2  x  4th rung x 2    wrist exerciser          flexbar strengthening x  x      Wrist exerciser  X 3 min        bilateral ball movements          digiflex X 3 min Red x x green blue   Wrist PRE's   2# (all planes) x 2 min each  X 3# x x   Forearm workout    Sup/pro with 2 weights x 3 min  x  3 weights    HEP issued         Therapeutic Activity                                                                Neuromuscular Re-education                                             X= performed this date as previously outlined    Pt education was provided on exam findings, treatment diagnosis, treatment plan, expectations, and prognosis. Pt was also provided recommendations for Oval 8 splint for (L) trigger thumb. Patient was instructed in and issued a HEP.    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   Increase LYN of (L) IF by 10-20 degrees to allow pt to use tools.  Increase LYN of (L) MF by 10-20 degrees to allow pt to hold bottles.   Increase LYN of (L) RF by 10-20 degrees to allow pt to carry shopping bag.   Increase LYN of (L) SF by 10-20 degrees to allow pt to  steering wheel.   Assess  and pinch when able.   Pt will be independent and compliant with comprehensive HEP to maintain progress achieved in OT    PLAN: Patient will be seen for 2 x/week or a total of 12 visits over a 90 day period.  Treatment will include: Manual Therapy, Neuromuscular Re-education, Self-Care Home Management, Therapeutic Activities, Therapeutic  Exercise, Home Exercise Program instruction, and Modalities to include: Ultrasound      Charges:  3 TE       Total Timed Treatment: 45 min     Total Treatment Time: 45 min         Eduin BentonOTR/L

## 2025-02-21 NOTE — TELEPHONE ENCOUNTER
Signed.   23 y/o F pmh anemia, p/w mild SOB x7d, somewhat worse today. + covid test 5d ago. No fever, cp, leg swelling, ocp's; hx vte. + sick contact w/ COVID.

## 2025-02-21 NOTE — TELEPHONE ENCOUNTER
Dr. Bedoya      Please sign off on form if you agree to: disability Right Carpal tunnel surgery Start: 0214/25  End: 03/07/25  (place your signature on the first page only)    -From your Inbasket, Highlight the patient and click Chart   -Double click the 01/31/25 Forms Completion telephone encounter  -Scroll down to the Media section   -Click the blue Hyperlink: disability   -Click Acknowledge located in the top right ribbon/menu   -Drag the mouse into the blank space of the document and a + sign will appear. Left click to   electronically sign the document.     Thank you,    Flash'

## 2025-02-21 NOTE — TELEPHONE ENCOUNTER
Additional disability paperwork and valid outside authorization received in the forms department and logged for processing.

## 2025-02-25 ENCOUNTER — OFFICE VISIT (OUTPATIENT)
Dept: ORTHOPEDICS CLINIC | Facility: CLINIC | Age: 55
End: 2025-02-25

## 2025-02-25 DIAGNOSIS — G56.01 RIGHT CARPAL TUNNEL SYNDROME: Primary | ICD-10-CM

## 2025-02-25 PROCEDURE — 99024 POSTOP FOLLOW-UP VISIT: CPT

## 2025-02-25 NOTE — PROGRESS NOTES
NURSING INTAKE COMMENTS:   Chief Complaint   Patient presents with    Post-Op     1st POV Right carpal tunnel release, sx 02/14/2025. Declines pain.         HPI: This 54 year old male presents today approximately 1.5 weeks status post right carpal tunnel release.  Patient states \"it feels like I have a new hand\".  He is extremely happy with both of his carpal tunnel releases that he has had from us.  Denies any numbness or tingling.  Denies any fever, chills, night sweats.  Denies any feelings of weakness.  He is currently off as a , as he has to be able to pull himself up into the truck with the use of his hands.  He is currently finishing up occupational therapy for his left hand following his left carpal tunnel release, and feels like it would be helpful to have for his right hand.    He is still doing well regarding his left carpal tunnel release.  He did have some elements of left trigger finger and his thumb, which she says is improved.  His occupational therapist recommended a thumb brace at night, and this has helped.  Discussed with them if he begins to have more catching or locking or issues regarding his left thumb he can make an appointment to be seen.    Past Medical History:    High blood pressure    Sleep apnea     Past Surgical History:   Procedure Laterality Date    Appendectomy      Arthroscopy of joint unlisted Right     Carpal tunnel release Left     Colonoscopy      Knee arthroscopy Right      Current Outpatient Medications   Medication Sig Dispense Refill    acetaminophen 500 MG Oral Tab Take 2 tablets (1,000 mg total) by mouth every 8 (eight) hours as needed for Pain. (Patient not taking: Reported on 2/25/2025) 40 tablet 0    ibuprofen 200 MG Oral Tab Take 3 tablets (600 mg total) by mouth every 8 (eight) hours as needed. Take with food. Stop if stomach upset. (Patient not taking: Reported on 2/25/2025) 40 tablet 0     Allergies[1]  Family History   Problem Relation Age of Onset     Cancer Father     Diabetes Father     Cancer Mother      No family Hx of DVT/PE    Social History     Occupational History    Not on file   Tobacco Use    Smoking status: Former     Types: Cigarettes    Smokeless tobacco: Never    Tobacco comments:     Quit smoking in 2011   Vaping Use    Vaping status: Never Used   Substance and Sexual Activity    Alcohol use: Not Currently     Comment: beer every 6 months    Drug use: Not Currently     Types: Cannabis     Comment: smoked marijuana at 16 yrs old    Sexual activity: Not on file        Review of Systems:  GENERAL: feels generally well, no significant weight loss or weight gain  SKIN: no ulcerated or worrisome skin lesions  EYES:denies blurred vision or double vision  HEENT: denies new nasal congestion, sinus pain or ST  LUNGS: denies shortness of breath  CARDIOVASCULAR: denies chest pain  GI: no hematemesis, no worsening heartburn, no diarrhea  : no dysuria, no blood in urine, no difficulty urinating, no incontinence  MUSCULOSKELETAL: no other musculoskeletal complaints other than in HPI  NEURO: no numbness or tingling, no weakness or balance disorder  PSYCHE: no depression or anxiety  HEMATOLOGIC: no hx of blood dyscrasia, no Hx DVT/PE  ENDOCRINE: no thyroid or diabetes issues  ALL/ASTHMA: no new hx of severe allergy or asthma    Physical Examination:    There were no vitals taken for this visit.  Constitutional: appears well hydrated, alert and responsive, no acute distress noted  Extremities: No asymmetric warmth or swelling.  Incision healing well, sutures removed today.  Musculoskeletal: Patient able to make full fist.  Able to move all 5 fingers.  Full range of motion in right wrist.  No weakness to median, ulnar, radial nerve today.  Neurological: Motor and sensory function intact.    Imaging: None taken today.      No results found.     No results found for: \"WBC\", \"HGB\", \"PLT\"   Lab Results   Component Value Date     (H) 09/14/2019    BUN 24.0 (H)  09/14/2019    CREATSERUM 1.09 09/14/2019        Assessment and Plan:  Diagnoses and all orders for this visit:    Right carpal tunnel syndrome  -     Occupational Therapy Referral - [facility], [place]        Assessment: As above    Plan: I gave the patient a prescription for occupational therapy for his right hand.  He is currently off as a .    On orthopedic standpoint, discussed the patient this could be her last visit as he is doing so well.  He will evaluate himself in 2 weeks.  If he is still doing well, we will see him as needed.  If he begins to have any pain or issues regarding his right hand he may make an appointment to follow-up with us.    I did discuss with the patient that Dr. Bedoya and I are leaving this office, her last day being March 14.  He is more than welcome to follow-up up with us at a private office or follow with the new doctors here at the clinic.    Follow Up: No follow-ups on file.    WARREN Jaime       [1] No Known Allergies

## 2025-02-26 ENCOUNTER — OFFICE VISIT (OUTPATIENT)
Dept: PHYSICAL THERAPY | Age: 55
End: 2025-02-26
Payer: COMMERCIAL

## 2025-02-26 DIAGNOSIS — G56.01 RIGHT CARPAL TUNNEL SYNDROME: Primary | ICD-10-CM

## 2025-02-26 PROCEDURE — 97165 OT EVAL LOW COMPLEX 30 MIN: CPT

## 2025-02-26 PROCEDURE — 97110 THERAPEUTIC EXERCISES: CPT

## 2025-02-26 NOTE — PROGRESS NOTES
OT UE EVALUATION:     Diagnosis:   s/p (R) CTR Patient:  Taras Toussaint (54 year old, male)        Referring Provider: Miryam Dowell  Today's Date   2/26/2025    Precautions:  None   Date of Evaluation: 02/26/25  Next MD visit: none scheduled  Date of Injury: No data recorded  Date of Surgery: 2/14/25     PATIENT SUMMARY   Summary of chief complaints: decreased function  History of current condition: Pt was diagnised with CTS in 2021. Symptoms reappeared in November 2025. Increased pain in hand limting pt's ablity to work and therfore pt elected to have surgery.   Pain level: current 0 /10, at best 0 /10, at worst 3 /10  Description of symptoms: dull ache   Occupation:    Occupational Roles: worker; parent   Leisure activities/Hobbies: none   Prior level of function: Independent  Current limitations: , opening jars  Pt goals: Return to PLOF  Hand Dominance: right  Living Situation: family    Past medical history was reviewed with Taras.  Significant findings include: none  Imaging/Tests: EMG, X-ray   Taras  has a past medical history of High blood pressure and Sleep apnea.  He  has a past surgical history that includes knee arthroscopy (Right); arthroscopy of joint unlisted (Right); colonoscopy; appendectomy; and carpal tunnel release (Left).    ASSESSMENT  Taras presents to occupational therapy evaluation with primary c/o decreased function. The results of the objective tests and measures show decreased ROM, decreaed strength, inceased edema and increased subjective c/o pain. Functional deficits include but are not limited to , opening jars. Signs and symptoms are consistent with diagnosis of s/p (R) CTR. Pt and OT discussed evaluation findings, pathology, POC and HEP.  Pt voiced understanding and performs HEP correctly without reported pain. Skilled Occupational Therapy is medically necessary to address the above impairments and reach functional goals.  OBJECTIVE:   Musculoskeletal  Observation:  Unremarkable Incison closed       Orthotics: None       Scar: flat      SAEED ROM WNL and Strength (5/5) except below:  (* denotes performed with pain)  Wrist   ROM    R L     Flex (C7) 60 80     Ext (C6) 65 60     Ulnar Dev 35 30     Radial Dev 20 15            Edema:  Circum Edema (cm) Wrist Crease     Right 19.5 cm      Left 19.8 cm        Neurological:  Sensory: WNL       Light Touch Ten Test: WNL    ADLs/IADLs:  ADL's    Bathing: Independent     Dressing: Independent     Feeding: Independent     Grooming: Independent  IADL's     Homemaking: Modified [place]     Food Prep: Modified [place]     Driving: Modified [place]   Other Functional Mobility/ADL Comments: Independent     Today's Treatment and Response:   Pt education was provided on exam findings, treatment diagnosis, treatment plan, expectations, and prognosis.  Today's Treatment       2/26/2025   OT Treatment   Therapeutic Exercise AROM with fluidotherapy x 10\"   Therapeutic Exercise Min 15   Eval Min 30   Total Timed Procedures 15   Total Service Procedures 45   Total Time 45         Patient was instructed in and issued a HEP for: AROM wrist  Tendon glides  Scar management     Charges:  OT EVAL Low Complexity,     Based on analysis of data from a problem-focused assessment from a brief chart review, clinical presentation of physical, cognitive and psychosocial skills, as well as review of patient rated outcome measures, this evaluation involved Low complexity decision making, with 1-3 occupational performance component deficits, no comorbidities, and no need for modification of tasks or assistance with assessment.                                                                                    PLAN OF CARE:    Goals: (to be met in 6 visits)   Goals       Therapy Goals     Increase (R) AROM for wrist flexion by 5-10 degrees to allow pt to use tools.   Increase (R) AROM for wrist extension by 5-10 degrees to allow pt to carry items in hand.    Increase (R) AROM for wrist UD by 5-10 degrees to allow pt to open jar.   Assess  and pinch when able.              Frequency / Duration: Patient will be seen 1-2 x week for 6 weeksx/week or a total of 6 visits over a 90 day period. Treatment will include: Manual Therapy; Self-Care Home Management; Therapeutic Activities; Therapeutic Exercise; Ultrasound; Home Exercise Program instruction    Education or treatment limitation: None   Rehab Potential: good     Patient/Family/Caregiver was advised of these findings, precautions, and treatment options and has agreed to actively participate in planning and for this course of care.    Thank you for your referral. Please co-sign or sign and return this letter via fax as soon as possible to [phone]. If you have any questions, please contact me at Dept: [phone]    Sincerely,  Electronically signed by therapist: Eduin Benton OT  Physician's certification required: Yes  I certify the need for these services furnished under this plan of treatment and while under my care.    X___________________________________________________ Date____________________    Certification From: 2/26/2025  To:5/27/2025

## 2025-03-03 ENCOUNTER — OFFICE VISIT (OUTPATIENT)
Dept: PHYSICAL THERAPY | Age: 55
End: 2025-03-03
Payer: COMMERCIAL

## 2025-03-03 PROCEDURE — 97110 THERAPEUTIC EXERCISES: CPT

## 2025-03-03 PROCEDURE — 97140 MANUAL THERAPY 1/> REGIONS: CPT

## 2025-03-03 NOTE — PROGRESS NOTES
Patient: Taras Toussaint (54 year old, male) Referring Provider:  Insurance:   Diagnosis: s/p (R) CTR Miryam Dowell  [employer] [place]   Date of Surgery: 2/14/25 Next MD visit:  N/A   Precautions:  None none scheduled Referral Information:   Date of Injury: No data recorded Date of Evaluation: Req: 3, Auth: 3, Exp: 2/25/2026 02/26/25 POC Auth Visits:          Today's Date   3/3/2025    Subjective  Pt states that his hand is healing well.       Pain: 0/10     Objective  See tx log for details             Assessment  Reviewed HEP with pt. Pt performing as instructed.  Mild density of scar adhesion noted with palpation. Decreased pain reported at scar and improved flexiblity when compared to the left hand.    Goals (to be met in 6 visits)   Goals       Therapy Goals     Increase (R) AROM for wrist flexion by 5-10 degrees to allow pt to use tools.   Increase (R) AROM for wrist extension by 5-10 degrees to allow pt to carry items in hand.   Increase (R) AROM for wrist UD by 5-10 degrees to allow pt to open jar.   Assess  and pinch when able.              Plan  Patient will be seen 1-2 x week for 6 weeksx/week or a total of 6 visits over a 90 day period.    Treatment Last 4 Visits        2/26/2025 3/3/2025   OT Treatment   Treatment Day  2   Therapeutic Exercise AROM with fluidotherapy x 10\" AROM with fluidotherapy x 10\"  Powerweb stretches x 3 min  Sponges-abd/add, grasp/ release   Manual Therapy  STM  Scar massage  Edema massage   Therapeutic Exercise Min 15 30   Manual Therapy Min  15   Eval Min 30    Total Timed Procedures 15 45   Total Service Procedures 45 45   Total Time 45 45              Charges  1MT, 2 SHARA Benton,OTR/L

## 2025-03-11 NOTE — TELEPHONE ENCOUNTER
Patient called to request revision to disability form for the Right Carpal Tunnel )Sx 2/14/25) Show patient is returning to work 3/7/25 - the correct date should reflect 4/12/25. Informed patient revision will be processed and faxed to [employer]  Revision rep informed

## 2025-03-11 NOTE — TELEPHONE ENCOUNTER
Forms revised and efaxed to [employer] [facility] at [phone]. [employer] message sent to patient.

## 2025-03-12 ENCOUNTER — OFFICE VISIT (OUTPATIENT)
Dept: PHYSICAL THERAPY | Age: 55
End: 2025-03-12
Payer: COMMERCIAL

## 2025-03-12 PROCEDURE — 97140 MANUAL THERAPY 1/> REGIONS: CPT

## 2025-03-12 PROCEDURE — 97110 THERAPEUTIC EXERCISES: CPT

## 2025-03-14 NOTE — TELEPHONE ENCOUNTER
Patient called to have revision re-faxed to [employer] [phone], advised patient will re-fax confirmation successful..

## 2025-03-17 ENCOUNTER — OFFICE VISIT (OUTPATIENT)
Dept: PHYSICAL THERAPY | Age: 55
End: 2025-03-17
Payer: COMMERCIAL

## 2025-03-17 PROCEDURE — 97140 MANUAL THERAPY 1/> REGIONS: CPT

## 2025-03-17 PROCEDURE — 97110 THERAPEUTIC EXERCISES: CPT

## 2025-03-17 NOTE — PROGRESS NOTES
Patient: Taras Toussaint (54 year old, male) Referring Provider:  Insurance:   Diagnosis: s/p (R) CTR Miryam Dowell  [employer]   Date of Surgery: 2/14/25 Next MD visit:  N/A   Precautions:  None 3/17/2025 Referral Information:   Date of Injury: No data recorded Date of Evaluation: Req: 0, Auth: 0, Exp:     02/26/25 POC Auth Visits:          Today's Date   3/17/2025    Subjective  Pt states that he has been doing the exercises.       Pain: 0/10     Objective  See tx log for details           Assessment  Pt eager to return to work. Reviewed importance of adhering to proper body mechanics tomiinze impingemtn to nerve and also to prevent triggering in finger. Pt had cortisone injection for (L) MF due to continued triggering. Pt works as a  and  which may contribute to wrist in flexed postion and sustained gripping. Discussed work modification including proper postioning of hand, use of ant-vibration glove, rest breaks and importance of coninuing with stretches. Issued median nerve glides for pt to perform after RTW. Pt verbalized good understanding of above recommendations.    Goals (to be met in 6 visits)   Increase (R) AROM for wrist flexion by 5-10 degrees to allow pt to use tools.   Increase (R) AROM for wrist extension by 5-10 degrees to allow pt to carry items in hand.   Increase (R) AROM for wrist UD by 5-10 degrees to allow pt to open jar.   Assess  and pinch when able.        Plan  Patient will be seen 1-2 x week for 6 weeksx/week or a total of 6 visits over a 90 day period.    Treatment Last 4 Visits        2/26/2025 3/3/2025 3/12/2025 3/17/2025   OT Treatment   Treatment Day  2 3 4   Therapeutic Exercise AROM with fluidotherapy x 10\" AROM with fluidotherapy x 10\"  Powerweb stretches x 3 min  Sponges-abd/add, grasp/ release AROM with fluidotherapy x 10\"  Powerweb stretches  Digiflex (red)  Hand helper with 2 Y RB  Wrist PRE's 2 # (all planes) x 2 min each AROM with fluidotherapy x  10\"  Powerweb stretches (green)  Digiflex (green)  Hand helper with 2 Y RB, 1 R RB  Wrist PRE's 3 # (all planes) x 2 min each  Pt education: proper body mechanics, adaptive equipment, stretches     Manual Therapy  STM  Scar massage  Edema massage STM  Scar massage STM  Scar massage with extractor   Therapeutic Exercise Min 15 30 35 35   Manual Therapy Min  15 10 10   Eval Min 30      Total Timed Procedures 15 45 45 45   Total Service Procedures 45 45 45 45   Total Time 45 45 45 45         HEP  Median nerve glides    Charges     1MT, 2 TE    Eduin Benton,OTR/L

## 2025-03-19 ENCOUNTER — OFFICE VISIT (OUTPATIENT)
Dept: PHYSICAL THERAPY | Age: 55
End: 2025-03-19
Payer: COMMERCIAL

## 2025-03-19 PROCEDURE — 97110 THERAPEUTIC EXERCISES: CPT

## 2025-03-19 NOTE — PROGRESS NOTES
Patient: Taras Toussaint (54 year old, male) Referring Provider:  Insurance:   Diagnosis: s/p (R) CTR Miryam Dowell  [place]   Date of Surgery: 2/14/25 Next MD visit:  N/A   Precautions:  None 3/17/2025 Referral Information:   Date of Injury: No data recorded Date of Evaluation: Req: 0, Auth: 0, Exp:     02/26/25 POC Auth Visits:          Today's Date   3/19/2025    Subjective  Pt states that he plans on going back to work on April 2.       Pain:  0/10     Objective  See tx log for details           Assessment  Decreased subjective c/o pain reported. No numbnes or tingling in hand since surgery. Good toelance to strengthening. Pt having mild stiffness in (L) MF. Advised pt to perform passive stretches and apply heat for stiffness. Pt agreed.    Goals (to be met in 6 visits)   Increase (R) AROM for wrist flexion by 5-10 degrees to allow pt to use tools.   Increase (R) AROM for wrist extension by 5-10 degrees to allow pt to carry items in hand.   Increase (R) AROM for wrist UD by 5-10 degrees to allow pt to open jar.   Assess  and pinch when able.            Plan  Patient will be seen 1-2 x week for 6 weeksx/week or a total of 6 visits over a 90 day period.    Treatment Last 4 Visits        3/3/2025 3/12/2025 3/17/2025 3/19/2025   OT Treatment   Treatment Day 2 3 4 5   Therapeutic Exercise AROM with fluidotherapy x 10\"  Powerweb stretches x 3 min  Sponges-abd/add, grasp/ release AROM with fluidotherapy x 10\"  Powerweb stretches  Digiflex (red)  Hand helper with 2 Y RB  Wrist PRE's 2 # (all planes) x 2 min each AROM with fluidotherapy x 10\"  Powerweb stretches (green)  Digiflex (green)  Hand helper with 2 Y RB, 1 R RB  Wrist PRE's 3 # (all planes) x 2 min each  Pt education: proper body mechanics, adaptive equipment, stretches   AROM with fluidotherapy x 10\"  Powerweb stretches (green)  Digiflex (green)  Hand helper with 2 Y RB, 1 R RB  Flexbar strengthening (red) wrist/flex, sup/pro x 2 min     Manual Therapy  STM  Scar massage  Edema massage STM  Scar massage STM  Scar massage with extractor    Therapeutic Exercise Min 30 35 35 45   Manual Therapy Min 15 10 10    Total Timed Procedures 45 45 45 45   Total Service Procedures 45 45 45 45   Total Time 45 45 45 45           Charges     3 TE    Eduin Benton,OTR/L

## 2025-03-24 ENCOUNTER — OFFICE VISIT (OUTPATIENT)
Dept: PHYSICAL THERAPY | Age: 55
End: 2025-03-24
Payer: COMMERCIAL

## 2025-03-24 PROCEDURE — 97110 THERAPEUTIC EXERCISES: CPT

## 2025-03-24 NOTE — PROGRESS NOTES
Patient: Taras Toussaint (55 year old, male) Referring Provider:  Insurance:   Diagnosis: s/p (R) CTR Miryam Dowell  [place]   Date of Surgery: 2/14/25 Next MD visit:  N/A   Precautions:  None 3/17/2025 Referral Information:   Date of Injury: No data recorded Date of Evaluation: Req: 0, Auth: 0, Exp:     02/26/25 POC Auth Visits:          Today's Date   3/24/2025    Subjective  Pt states that he has no difficulty  completing his  excercises at home.       Pain: 0/10     Objective  See tx log for details         Assessment  Patient has no complaints of pain when completing therapeutic excercise.Patient demonstrates good tolerance when increased  resistance for  to be able to complete functional activities such as opening a jar, holding items in his hands  and  holding the wheel when RTW. Patient is eager to go back to work and demonstrates a good understanding of HEP (Median  Nerve Glides) that was given to follow after he RTW. Will  complete discharge at next visit and review HEP with pt.    Goals (to be met in 6 visits)   Increase (R) AROM for wrist flexion by 5-10 degrees to allow pt to use tools.   Increase (R) AROM for wrist extension by 5-10 degrees to allow pt to carry items in hand.   Increase (R) AROM for wrist UD by 5-10 degrees to allow pt to open jar.   Assess  and pinch when able.                Plan  Patient will be seen 1-2 x week for 6 weeksx/week or a total of 6 visits over a 90 day period.    Treatment Last 4 Visits        3/12/2025 3/17/2025 3/19/2025 3/24/2025   OT Treatment   Treatment Day 3 4 5 6   Therapeutic Exercise AROM with fluidotherapy x 10\"  Powerweb stretches  Digiflex (red)  Hand helper with 2 Y RB  Wrist PRE's 2 # (all planes) x 2 min each AROM with fluidotherapy x 10\"  Powerweb stretches (green)  Digiflex (green)  Hand helper with 2 Y RB, 1 R RB  Wrist PRE's 3 # (all planes) x 2 min each  Pt education: proper body mechanics, adaptive equipment, stretches   AROM with  fluidotherapy x 10\"  Powerweb stretches (green)  Digiflex (green)  Hand helper with 2 Y RB, 1 R RB  Flexbar strengthening (red) wrist/flex, sup/pro x 2 min   AROM with fluidotherapy x 10\"  Powerweb stretches (Green)  Digiflex (Blue)  Hand helper with 2 Y RB, 1 R RB  Flexbar strengthening (red) wrist/flex, sup/pro x 3 min     Neuro Re-Educ    Median Nerve Glides   Manual Therapy STM  Scar massage STM  Scar massage with extractor     Neuro Re-Ed Min    3   Therapeutic Exercise Min 35 35 45    Ther Activity Min    42   Manual Therapy Min 10 10     Total Timed Procedures 45 45 45 45   Total Service Procedures 45 45 45 45   Total Time 45 45 45 45         HEP  Median Nerve Glides    Charges     3TE    Eduin Benton,OTR/L

## 2025-03-26 ENCOUNTER — OFFICE VISIT (OUTPATIENT)
Dept: PHYSICAL THERAPY | Age: 55
End: 2025-03-26
Payer: COMMERCIAL

## 2025-03-26 PROCEDURE — 97110 THERAPEUTIC EXERCISES: CPT

## 2025-03-27 NOTE — PROGRESS NOTES
Patient: Taras Toussaint (55 year old, male) Referring Provider:  Insurance:   Diagnosis: s/p (R) CTR Miryam Dowell  [employer]   Date of Surgery: 2/14/25 Next MD visit:  N/A   Precautions:  None No data recorded Referral Information:    Date of Evaluation: Req: 0, Auth: 0, Exp:     02/26/25 POC Auth Visits:  12       Today's Date   3/26/2025    Subjective  Patient states that he is ready to go back to work and hands are feeling good.       Pain: 0/10     Objective  See tx log for details       {PT OBJECTVE:38767}     Assessment  Pt seen for 7/12 visits since 2/26/25 . Treatment consists of therapeutic exercises, therapeutic activities, manual therapy, neuromuscular reeducation, self-care retraining, modalities such as fluidotherapy, pt education, edema management. Upon assessment patient presents with increased wrist/hand strength and ROM . Based on patients subjective and objective measurement patient  has met most goals and is ready to RTW and be discharged. Patient was issued the [facility] UE strengthening program. Pt able to demonstrate good understanding of HEP. No further OT is required at this time. Pt to follow up with MD as needed.    Goals (to be met in 6 visits)   Increase (R) AROM for wrist flexion by 5-10 degrees to allow pt to use tools.   Increase (R) AROM for wrist extension by 5-10 degrees to allow pt to carry items in hand.   Increase (R) AROM for wrist UD by 5-10 degrees to allow pt to open jar.   Assess  and pinch when able.     Plan   Discharge pt from OT. Pt to continue HEP and follow up with MD as needed    Treatment Last 4 Visits  Treatment Day: 7       3/26/2025   UE Treatment   Therapeutic Exercise Fluidotherapy with AROM x 10\"  Objective measurements  Pt education: body mechanics, return to work, Strengthening HEP     Therapeutic Exercise Minutes 45   Total Time Of Timed Procedures 45   Total Time Of Service-Based Procedures 0   Total Treatment Time 45   [facility]          HEP  [facility] UE Strengthening     Charges  3TE

## 2025-03-27 NOTE — PROGRESS NOTES
Patient: Taras Toussaint (55 year old, male) Referring Provider:  Insurance:   Diagnosis: s/p (R) CTR Miryam Dowell  [place]   Date of Surgery: 2/14/25 Next MD visit:  N/A   Precautions:  None No data recorded Referral Information:    Date of Evaluation: Req: 0, Auth: 0, Exp:     02/26/25 POC Auth Visits:  12       Today's Date   3/26/2025    Subjective  Patient states that he is ready to go back to work and hands are feeling good.       Pain: 0/10     Objective  See tx log for details    Wrist       2/26/2025   Wrist ROM/MMT   Rt Wrist Flex (C7) 60   Lt Wrist Flex (C7) 80   Rt Wrist ext (C6) 65   Lt Wrist ext (C6) 60   Rt Wrist Ulnar Deviation 35   Lt Wrist Ulnar Deviation 30   Rt Wrist Radial Deviation 20   Lt Wrist Radial Deviation 15   Hand Strength    R        L     81      64  Lateral pinch  20      17  3pt pinch  22      19  2pt pinch  19      15          Assessment  Pt seen for 7/12 visits since 2/26/25 . Treatment consists of therapeutic exercises, therapeutic activities, manual therapy, neuromuscular reeducation, self-care retraining, modalities such as fluidotherapy, pt education, edema management. Upon assessment patient presents with increased wrist/hand strength and ROM . Based on patients subjective and objective measurement patient  has met most goals and is ready to RTW and be discharged. Patient was issued the [facility] UE strengthening program. Pt able to demonstrate good understanding of HEP. No further OT is required at this time. Pt to follow up with MD as needed.    Goals (to be met in 6 visits)   Increase (R) AROM for wrist flexion by 5-10 degrees to allow pt to use tools.PARTIALLY MET..pt met functional portion of goal but not objective.   Increase (R) AROM for wrist extension by 5-10 degrees to allow pt to carry items in hand. PARTIALLY MET..pt met functional portion of goal but not objective.  Increase (R) AROM for wrist UD by 5-10 degrees to allow pt to open jar. PARTIALLY MET..pt met  functional portion of goal but not objective.  Assess  and pinch when able. MET   Plan   Discharge pt from OT. Pt to continue HEP and follow up with MD as needed    Treatment Last 4 Visits  Treatment Day: 7       3/26/2025   UE Treatment   Therapeutic Exercise Fluidotherapy with AROM x 10\"  Objective measurements  Pt education: body mechanics, return to work, Strengthening HEP     Therapeutic Exercise Minutes 45   Total Time Of Timed Procedures 45   Total Time Of Service-Based Procedures 0   Total Treatment Time 45   HEP [facility] UE Strengthening         HEP  [facility] UE Strengthening     Charges  3TE     Arabella Benton, OTR/L

## (undated) DEVICE — SOLUTION IRRIG 1000ML 0.9% NACL USP BTL

## (undated) DEVICE — APPLICATOR PREP 26ML CHG 2% ISO ALC 70%

## (undated) DEVICE — SUCTION CANISTER, 3000CC,SAFELINER: Brand: DEROYAL

## (undated) DEVICE — BANDAGE,GAUZE,CONFORMING,3"X75",STRL,LF: Brand: MEDLINE

## (undated) DEVICE — GAMMEX® NON-LATEX PI ORTHO SIZE 8, STERILE POLYISOPRENE POWDER-FREE SURGICAL GLOVE: Brand: GAMMEX

## (undated) DEVICE — GAMMEX® NON-LATEX PI ORTHO SIZE 7, STERILE POLYISOPRENE POWDER-FREE SURGICAL GLOVE: Brand: GAMMEX

## (undated) DEVICE — OSTEOTOME INTM SGL SRG CTRS

## (undated) DEVICE — 3M™ COBAN™ NL STERILE NON-LATEX SELF-ADHERENT WRAP, 2084S, 4 IN X 5 YD (10 CM X 4,5 M), 18 ROLLS/CASE: Brand: 3M™ COBAN™

## (undated) DEVICE — GAMMEX® PI HYBRID SIZE 7.5, STERILE POWDER-FREE SURGICAL GLOVE, POLYISOPRENE AND NEOPRENE BLEND: Brand: GAMMEX

## (undated) DEVICE — PACK CDS UPPER EXTREMITY

## (undated) DEVICE — BANDAGE COMPR 4INX12FT E DISP ESMARCH EVEN

## (undated) DEVICE — IMPERVIOUS STOCKINETTE: Brand: DEROYAL

## (undated) DEVICE — DISPOSABLE TOURNIQUET CUFF SINGLE BLADDER, DUAL PORT AND QUICK CONNECT CONNECTOR: Brand: COLOR CUFF

## (undated) DEVICE — SUT ETHLN 4-0 18IN P-3 NABSRB BLK 13MM 3/8 CI

## (undated) DEVICE — GAMMEX® PI HYBRID SIZE 6.5, STERILE POWDER-FREE SURGICAL GLOVE, POLYISOPRENE AND NEOPRENE BLEND: Brand: GAMMEX

## (undated) DEVICE — Device

## (undated) DEVICE — BANDGE GZ 2X75IN 1 PLY RAYON CONF KLING

## (undated) NOTE — LETTER
Date & Time: 8/30/2024, 7:32 PM  Patient: Taras Toussaint  Encounter Provider(s):    Amber Vizcarra MD       To Whom It May Concern:    Taras Toussaint was seen and treated in our department on 8/30/2024. He can return to work without restrictions.    If you have any questions or concerns, please do not hesitate to call.        _____________________________  Physician/APC Signature

## (undated) NOTE — LETTER
12/10/2024          To Whom It May Concern:    Taras Toussaint is currently under my medical care and   He has bilateral carpal tunnel syndrome and left 4th digit trigger finger, He will have his right carpal tunnel release and if everything goes well after 3 weeks after this surgery he will have his left carpal tunnel released.   He may eventually need trigger finger injection in left 4th finger.       If you require additional information please contact our office.        Sincerely,    Ry Bedoya MD

## (undated) NOTE — LETTER
AUTHORIZATION FOR SURGICAL OPERATION OR OTHER PROCEDURE    1. I hereby authorize Dr. Bedoya/ Miryam Dowell PA-C, and [facility] staff assigned to my case to perform the following operation and/or procedure at the [facility] site:    _______________________________________________________________________________________________    Cortisone injection to Right shoulder  _______________________________________________________________________________________________    2.  My physician has explained the nature and purpose of the operation or other procedure, possible alternative methods of treatment, the risks involved, and the possibility of complication to me.  I acknowledge that no guarantee has been made as to the result that may be obtained.  3.  I recognize that, during the course of this operation, or other procedure, unforseen conditions may necessitate additional or different procedure than those listed above.  I, therefore, further authorize and request that the above named physician, his/her physician assistants or designees perform such procedures as are, in his/her professional opinion, necessary and desirable.  4.  Any tissue or organs removed in the operation or other procedure may be disposed of by and at the discretion of the [facility] and [facility].  5.  I understand that in the event of a medical emergency, I will be transported by local paramedics to [facility] or other hospital emergency department.  6.  I certify that I have read and fully understand the above consent to operation and/or other procedure.    7.  I acknowledge that my physician has explained sedation/analgesia administration to me including the risks and benefits.  I consent to the administration of sedation/analgesia as may be necessary or desirable in the judgement of my physician.    Witness signature: ___________________________________________________ Date:   ______/______/_____                    Time:  ________ A.M.  P.M.       Patient Name:  ______________________________________________________  (please print)      Patient signature:  ___________________________________________________             Relationship to Patient:           []  Parent    Responsible person                          []  Spouse  In case of minor or                    [] Other  _____________   Incompetent name:  __________________________________________________                               (please print)      _____________      Responsible person  In case of minor or  Incompetent signature:  _______________________________________________    Statement of Physician  My signature below affirms that prior to the time of the procedure, I have explained to the patient and/or his/her guardian, the risks and benefits involved in the proposed treatment and any reasonable alternative to the proposed treatment.  I have also explained the risks and benefits involved in the refusal of the proposed treatment and have answered the patient's questions.                        Date:  ______/______/_______  Provider                      Signature:  __________________________________________________________       Time:  ___________ A.M    P.M.

## (undated) NOTE — LETTER
11/15/2024          To Whom It May Concern:    Taras Toussaint is currently under my medical care. Please excuse him from work until 11/19/24. He will follow up with me on 11/19/24.     If you require additional information please contact our office.        Sincerely,    Ry Bedoya MD

## (undated) NOTE — LETTER
AUTHORIZATION FOR SURGICAL OPERATION OR OTHER PROCEDURE    1. I hereby authorize Dr. Bedoya , and [facility] staff assigned to my case to perform the following operation and/or procedure at the [facility] site:    ________________________Cortisone injection left knee__________________________      _______________________________________________________________________________________________    2.  My physician has explained the nature and purpose of the operation or other procedure, possible alternative methods of treatment, the risks involved, and the possibility of complication to me.  I acknowledge that no guarantee has been made as to the result that may be obtained.  3.  I recognize that, during the course of this operation, or other procedure, unforseen conditions may necessitate additional or different procedure than those listed above.  I, therefore, further authorize and request that the above named physician, his/her physician assistants or designees perform such procedures as are, in his/her professional opinion, necessary and desirable.  4.  Any tissue or organs removed in the operation or other procedure may be disposed of by and at the discretion of the [facility] and [facility].  5.  I understand that in the event of a medical emergency, I will be transported by local paramedics to [facility] or other hospital emergency department.  6.  I certify that I have read and fully understand the above consent to operation and/or other procedure.    7.  I acknowledge that my physician has explained sedation/analgesia administration to me including the risks and benefits.  I consent to the administration of sedation/analgesia as may be necessary or desirable in the judgement of my physician.    Witness signature: ___________________________________________________ Date:  ______/______/_____                    Time:  ________ AMARTIN  PMARTIN        Patient Name:  ______________________________________________________  (please print)      Patient signature:  ___________________________________________________             Relationship to Patient:           []  Parent    Responsible person                          []  Spouse  In case of minor or                    [] Other  _____________   Incompetent name:  __________________________________________________                               (please print)      _____________      Responsible person  In case of minor or  Incompetent signature:  _______________________________________________    Statement of Physician  My signature below affirms that prior to the time of the procedure, I have explained to the patient and/or his/her guardian, the risks and benefits involved in the proposed treatment and any reasonable alternative to the proposed treatment.  I have also explained the risks and benefits involved in the refusal of the proposed treatment and have answered the patient's questions.                        Date:  ______/______/_______  Provider                      Signature:  __________________________________________________________       Time:  ___________ A.M    P.M.

## (undated) NOTE — LETTER
10/21/2024          To Whom It May Concern:    Taras Toussaint is currently under my medical care and in regard on his Right shoulder he is release and reach MMI  He may return to work on 10/22/2024 full duty with not restrictions.      If you require additional information please contact our office.        Sincerely,    Ry Bedoya MD

## (undated) NOTE — LETTER
11/19/2024          To Whom It May Concern:    Taras Toussaint is currently under my medical care.  Please excuse him from work for 4 weeks.     If you require additional information please contact our office.        Sincerely,    Ry Bedoya MD

## (undated) NOTE — LETTER
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     If you require additional information please contact our office.        Sincerely,    Ry Bedoya MD